# Patient Record
Sex: FEMALE | Race: OTHER | HISPANIC OR LATINO | Employment: FULL TIME | URBAN - METROPOLITAN AREA
[De-identification: names, ages, dates, MRNs, and addresses within clinical notes are randomized per-mention and may not be internally consistent; named-entity substitution may affect disease eponyms.]

---

## 2017-05-11 ENCOUNTER — ALLSCRIPTS OFFICE VISIT (OUTPATIENT)
Dept: OTHER | Facility: OTHER | Age: 27
End: 2017-05-11

## 2017-05-11 DIAGNOSIS — Z00.00 ENCOUNTER FOR GENERAL ADULT MEDICAL EXAMINATION WITHOUT ABNORMAL FINDINGS: ICD-10-CM

## 2017-05-11 DIAGNOSIS — L65.9 NONSCARRING HAIR LOSS: ICD-10-CM

## 2017-05-11 DIAGNOSIS — H10.13 ACUTE ATOPIC CONJUNCTIVITIS OF BOTH EYES: ICD-10-CM

## 2017-05-19 ENCOUNTER — GENERIC CONVERSION - ENCOUNTER (OUTPATIENT)
Dept: OTHER | Facility: OTHER | Age: 27
End: 2017-05-19

## 2017-08-03 ENCOUNTER — GENERIC CONVERSION - ENCOUNTER (OUTPATIENT)
Dept: OTHER | Facility: OTHER | Age: 27
End: 2017-08-03

## 2017-08-03 ENCOUNTER — TRANSCRIBE ORDERS (OUTPATIENT)
Dept: LAB | Facility: CLINIC | Age: 27
End: 2017-08-03

## 2017-08-03 ENCOUNTER — APPOINTMENT (OUTPATIENT)
Dept: LAB | Facility: CLINIC | Age: 27
End: 2017-08-03

## 2017-08-03 DIAGNOSIS — Z00.00 ENCOUNTER FOR GENERAL ADULT MEDICAL EXAMINATION WITHOUT ABNORMAL FINDINGS: ICD-10-CM

## 2017-08-03 DIAGNOSIS — L65.9 NONSCARRING HAIR LOSS: ICD-10-CM

## 2017-08-03 DIAGNOSIS — H10.13 ACUTE ATOPIC CONJUNCTIVITIS OF BOTH EYES: ICD-10-CM

## 2017-08-03 LAB
ALBUMIN SERPL BCP-MCNC: 4.3 G/DL (ref 3.5–5)
ALP SERPL-CCNC: 64 U/L (ref 46–116)
ALT SERPL W P-5'-P-CCNC: 24 U/L (ref 12–78)
ANION GAP SERPL CALCULATED.3IONS-SCNC: 9 MMOL/L (ref 4–13)
AST SERPL W P-5'-P-CCNC: 19 U/L (ref 5–45)
BASOPHILS # BLD AUTO: 0.03 THOUSANDS/ΜL (ref 0–0.1)
BASOPHILS NFR BLD AUTO: 0 % (ref 0–1)
BILIRUB SERPL-MCNC: 0.68 MG/DL (ref 0.2–1)
BUN SERPL-MCNC: 8 MG/DL (ref 5–25)
CALCIUM SERPL-MCNC: 9.5 MG/DL (ref 8.3–10.1)
CHLORIDE SERPL-SCNC: 101 MMOL/L (ref 100–108)
CHOLEST SERPL-MCNC: 182 MG/DL (ref 50–200)
CO2 SERPL-SCNC: 26 MMOL/L (ref 21–32)
CREAT SERPL-MCNC: 0.52 MG/DL (ref 0.6–1.3)
EOSINOPHIL # BLD AUTO: 0.37 THOUSAND/ΜL (ref 0–0.61)
EOSINOPHIL NFR BLD AUTO: 5 % (ref 0–6)
ERYTHROCYTE [DISTWIDTH] IN BLOOD BY AUTOMATED COUNT: 12.5 % (ref 11.6–15.1)
EST. AVERAGE GLUCOSE BLD GHB EST-MCNC: 111 MG/DL
GFR SERPL CREATININE-BSD FRML MDRD: 132 ML/MIN/1.73SQ M
GLUCOSE P FAST SERPL-MCNC: 77 MG/DL (ref 65–99)
HBA1C MFR BLD: 5.5 % (ref 4.2–6.3)
HCT VFR BLD AUTO: 40.6 % (ref 34.8–46.1)
HDLC SERPL-MCNC: 48 MG/DL (ref 40–60)
HGB BLD-MCNC: 14.2 G/DL (ref 11.5–15.4)
LDLC SERPL CALC-MCNC: 115 MG/DL (ref 0–100)
LYMPHOCYTES # BLD AUTO: 2.59 THOUSANDS/ΜL (ref 0.6–4.47)
LYMPHOCYTES NFR BLD AUTO: 35 % (ref 14–44)
MCH RBC QN AUTO: 30.3 PG (ref 26.8–34.3)
MCHC RBC AUTO-ENTMCNC: 35 G/DL (ref 31.4–37.4)
MCV RBC AUTO: 87 FL (ref 82–98)
MONOCYTES # BLD AUTO: 0.47 THOUSAND/ΜL (ref 0.17–1.22)
MONOCYTES NFR BLD AUTO: 6 % (ref 4–12)
NEUTROPHILS # BLD AUTO: 3.95 THOUSANDS/ΜL (ref 1.85–7.62)
NEUTS SEG NFR BLD AUTO: 54 % (ref 43–75)
NRBC BLD AUTO-RTO: 0 /100 WBCS
PLATELET # BLD AUTO: 234 THOUSANDS/UL (ref 149–390)
PMV BLD AUTO: 12.1 FL (ref 8.9–12.7)
POTASSIUM SERPL-SCNC: 3.8 MMOL/L (ref 3.5–5.3)
PROT SERPL-MCNC: 7.8 G/DL (ref 6.4–8.2)
RBC # BLD AUTO: 4.68 MILLION/UL (ref 3.81–5.12)
SODIUM SERPL-SCNC: 136 MMOL/L (ref 136–145)
TRIGL SERPL-MCNC: 95 MG/DL
TSH SERPL DL<=0.05 MIU/L-ACNC: 0.72 UIU/ML (ref 0.36–3.74)
WBC # BLD AUTO: 7.42 THOUSAND/UL (ref 4.31–10.16)

## 2017-08-03 PROCEDURE — 36415 COLL VENOUS BLD VENIPUNCTURE: CPT

## 2017-08-03 PROCEDURE — 80061 LIPID PANEL: CPT

## 2017-08-03 PROCEDURE — 83036 HEMOGLOBIN GLYCOSYLATED A1C: CPT

## 2017-08-03 PROCEDURE — 84443 ASSAY THYROID STIM HORMONE: CPT

## 2017-08-03 PROCEDURE — 85025 COMPLETE CBC W/AUTO DIFF WBC: CPT

## 2017-08-03 PROCEDURE — 80053 COMPREHEN METABOLIC PANEL: CPT

## 2017-10-04 ENCOUNTER — ALLSCRIPTS OFFICE VISIT (OUTPATIENT)
Dept: OTHER | Facility: OTHER | Age: 27
End: 2017-10-04

## 2017-10-05 NOTE — PROGRESS NOTES
Assessment  1  Acute upper respiratory infection (465 9) (J06 9)    Plan  Acute upper respiratory infection    · Promethazine-Codeine 6 25-10 MG/5ML Oral Syrup; TAKE 5 ML BY MOUTH AT  BEDTIME AS NEEDED    Discussion/Summary    Mod[place] Sims is here with upper respiratory infection symptoms  I explained that this is viral so antibiotics will not be helpful  I recommended increased water intake  She could tried Mucinex D in the morning and she could also use Tylenol alternating with ibuprofen about every 3 hours for body aches gave her prescription for promethazine with codeine for nighttime coughing  should contact us if symptoms are not improving  Possible side effects of new medications were reviewed with the patient/guardian today  The treatment plan was reviewed with the patient/guardian  The patient/guardian understands and agrees with the treatment plan      Chief Complaint  c/o cold/ allergic symptoms x 3days      History of Present Illness  HPI: Started with ST 3 days ago now nasal congestion/drainage  of coughing which is dry  GI sx  Review of Systems    Constitutional: feeling poorly,-chills-and-feeling tired, but-no fever  ENT: as noted in HPI  Cardiovascular: no complaints of slow or fast heart rate, no chest pain, no palpitations, no leg claudication or lower extremity edema  Respiratory: cough, but-no shortness of breath-and-no wheezing  Gastrointestinal: no complaints of abdominal pain, no constipation, no nausea or diarrhea, no vomiting, no bloody stools  Active Problems  1  Allergic conjunctivitis of both eyes (372 14) (H10 13)   2  Thinning hair (704 00) (L65 9)    Past Medical History  Active Problems And Past Medical History Reviewed: The active problems and past medical history were reviewed and updated today  Family History  Mother    1   Family history of Controlled type 2 diabetes mellitus    Social History   · Caffeine use (C67 89) (F15 90)   · Never smoker   · No illicit drug use   · Occasional alcohol use    Current Meds   1  Cromolyn Sodium 4 % Ophthalmic Solution; INSTILL 1-2 DROPS INTO AFFECTED   EYE(S)  4 TIMES DAILY AS NEEDED; Therapy: 69HPA2776 to (Evaluate:[id number])  Requested for: 87RXM7251; Last   Rx:[id number] Ordered    The medication list was reviewed and updated today  Allergies  1  No Known Drug Allergies    Vitals   Recorded: 07DRP4818 01:25PM   Temperature 98 8 F   Heart Rate 80   Respiration 16   Systolic 635   Diastolic 60   Height 4 ft 11 4 in   Weight 126 lb    BMI Calculated 25 11   BSA Calculated 1 52     Physical Exam    Constitutional   General appearance: No acute distress, well appearing and well nourished  Eyes   Conjunctiva and lids: No swelling, erythema or discharge  Pupils and irises: Equal, round and reactive to light  Ears, Nose, Mouth, and Throat   External inspection of ears and nose: Normal     Otoscopic examination: Tympanic membranes translucent with normal light reflex  Canals patent without erythema  Nasal mucosa, septum, and turbinates: Abnormal   There was clear rhinorrhea from both nares  Oropharynx: Normal with no erythema, edema, exudate or lesions  Pulmonary   Respiratory effort: No increased work of breathing or signs of respiratory distress  Auscultation of lungs: Clear to auscultation  Cardiovascular   Auscultation of heart: Normal rate and rhythm, normal S1 and S2, without murmurs  Lymphatic   Palpation of lymph nodes in neck: No lymphadenopathy           Signatures   Electronically signed by : Murphy Wilcox MD; Oct  4 2017  6:42PM EST                       (Author)

## 2018-01-10 NOTE — RESULT NOTES
Verified Results  (1) CBC/PLT/DIFF [id number] 09:42AM Patti Williamson    Order Number: [id number]     Test Name Result Flag Reference   WBC COUNT 7 42 Thousand/uL  4 31-10 16   RBC COUNT 4 68 Million/uL  3 81-5 12   HEMOGLOBIN 14 2 g/dL  11 5-15 4   HEMATOCRIT 40 6 %  34 8-46  1   MCV 87 fL  82-98   MCH 30 3 pg  26 8-34 3   MCHC 35 0 g/dL  31 4-37 4   RDW 12 5 %  11 6-15 1   MPV 12 1 fL  8 9-12 7   PLATELET COUNT 963 Thousands/uL  149-390   nRBC AUTOMATED 0 /100 WBCs     NEUTROPHILS RELATIVE PERCENT 54 %  43-75   LYMPHOCYTES RELATIVE PERCENT 35 %  14-44   MONOCYTES RELATIVE PERCENT 6 %  4-12   EOSINOPHILS RELATIVE PERCENT 5 %  0-6   BASOPHILS RELATIVE PERCENT 0 %  0-1   NEUTROPHILS ABSOLUTE COUNT 3 95 Thousands/? ??L  1 85-7 62   LYMPHOCYTES ABSOLUTE COUNT 2 59 Thousands/? ??L  0 60-4 47   MONOCYTES ABSOLUTE COUNT 0 47 Thousand/? ??L  0 17-1 22   EOSINOPHILS ABSOLUTE COUNT 0 37 Thousand/? ??L  0 00-0 61   BASOPHILS ABSOLUTE COUNT 0 03 Thousands/? ??L  0 00-0 10     (1) COMPREHENSIVE METABOLIC PANEL [id number] 30:10ZW Patti Williamson    Order Number: [id number]     Test Name Result Flag Reference   SODIUM 136 mmol/L  136-145   POTASSIUM 3 8 mmol/L  3 5-5 3   CHLORIDE 101 mmol/L  100-108   CARBON DIOXIDE 26 mmol/L  21-32   ANION GAP (CALC) 9 mmol/L  4-13   BLOOD UREA NITROGEN 8 mg/dL  5-25   CREATININE 0 52 mg/dL L 0 60-1 30   Standardized to IDMS reference method   CALCIUM 9 5 mg/dL  8 3-10 1   BILI, TOTAL 0 68 mg/dL  0 20-1 00   ALK PHOSPHATAS 64 U/L     ALT (SGPT) 24 U/L  12-78   AST(SGOT) 19 U/L  5-45   ALBUMIN 4 3 g/dL  3 5-5 0   TOTAL PROTEIN 7 8 g/dL  6 4-8 2   eGFR 132 ml/min/1 73sq m     National Kidney Disease Education Program recommendations are as follows:  GFR calculation is accurate only with a steady state creatinine  Chronic Kidney disease less than 60 ml/min/1 73 sq  meters  Kidney failure less than 15 ml/min/1 73 sq  meters     GLUCOSE FASTING 77 mg/dL  65-99     (1) TSH WITH FT4 REFLEX 03Aug2017 09:42AM James Clear   TW Order Number: [id number]     Test Name Result Flag Reference   TSH 0 722 uIU/mL  0 358-3 740   Patients undergoing fluorescein dye angiography may retain small amounts of fluorescein in the body for 48-72 hours post procedure  Samples containing fluorescein can produce falsely depressed TSH values  If the patient had this procedure,a specimen should be resubmitted post fluorescein clearance  The recommended reference ranges for TSH during pregnancy are as follows:  First trimester 0 1 to 2 5 uIU/mL  Second trimester  0 2 to 3 0 uIU/mL  Third trimester 0 3 to 3 0 uIU/m     (1) LIPID PANEL, FASTING 03Aug2017 09:42AM James Clear   TW Order Number: [id number]     Test Name Result Flag Reference   CHOLESTEROL 182 mg/dL     HDL,DIRECT 48 mg/dL  40-60   Specimen collection should occur prior to Metamizole administration due to the potential for falsely depressed results  LDL CHOLESTEROL CALCULATED 115 mg/dL H 0-100   Triglyceride:         Normal              <150 mg/dl       Borderline High    150-199 mg/dl       High               200-499 mg/dl       Very High          >499 mg/dl  Cholesterol:         Desirable        <200 mg/dl      Borderline High  200-239 mg/dl      High             >239 mg/dl  HDL Cholesterol:        High    >59 mg/dL      Low     <41 mg/dL  LDL CALCULATED:    This screening LDL is a calculated result  It does not have the accuracy of the Direct Measured LDL in the monitoring of patients with hyperlipidemia and/or statin therapy  Direct Measure LDL (RDK895) must be ordered separately in these patients  TRIGLYCERIDES 95 mg/dL  <=150   Specimen collection should occur prior to N-Acetylcysteine or Metamizole administration due to the potential for falsely depressed results       (1) HEMOGLOBIN A1C 03Aug2017 09:42AM James Clear   TW Order Number: [id number]     Test Name Result Flag Reference HEMOGLOBIN A1C 5 5 %  4 2-6 3   EST  AVG   GLUCOSE 111 mg/dl

## 2018-01-11 NOTE — MISCELLANEOUS
Message  Return to work or school:   Jillian Villagran is under my professional care   She was seen in my office on 10/04/2017 FOR Acute upper respiratory infection (465 9) (J06 9)   She is able to return to work on  10/06/2017       Nusrat Edmond MD       Signatures   Electronically signed by : Nusrat Edmond MD; Oct  4 2017  7:03PM EST                       (Author)    Electronically signed by : Gabi Serrato, ; Oct  9 2017  4:25PM EST                       (Author)

## 2018-01-12 VITALS
HEART RATE: 80 BPM | WEIGHT: 124.13 LBS | SYSTOLIC BLOOD PRESSURE: 106 MMHG | HEIGHT: 59 IN | TEMPERATURE: 98.2 F | BODY MASS INDEX: 25.02 KG/M2 | DIASTOLIC BLOOD PRESSURE: 76 MMHG

## 2018-01-14 VITALS
RESPIRATION RATE: 16 BRPM | HEIGHT: 59 IN | WEIGHT: 126 LBS | SYSTOLIC BLOOD PRESSURE: 110 MMHG | DIASTOLIC BLOOD PRESSURE: 60 MMHG | TEMPERATURE: 98.8 F | BODY MASS INDEX: 25.4 KG/M2 | HEART RATE: 80 BPM

## 2018-01-18 NOTE — MISCELLANEOUS
Message   Recorded as Task   Date: 05/19/2017 10:48 AM, Created By: Ted Craven   Task Name: Medical Complaint Callback   Assigned To: Ted Craven   Regarding Patient: Alphonza Fabry, Status: Active   Comment:    Ted Craven - 19 May 2017 10:48 AM     TASK CREATED  Caller: Self; Medical Complaint; [phone] (Home)  Pt called and l/m stating that the cromalyn eye gtts burn eyes when she initially puts in eye and she questions if this is usual   She also states zyrtec is not helping her allergy s/s and she is questioning what else she can use? Bernarda Gerardo - 19 May 2017 12:29 PM     TASK REPLIED TO: Previously Assigned To Bernarda Gerardo  Please tell the patient that her hearing is one of the common reactions to those eyedrops  Is the burning resolving quickly after the drops are instilled or does it persist for a long time? She wanted to try a different eyedrop? Burning is still a possibility but if she prefers she could try Patanol 0 1% drops, one drop in each eye twice daily as needed ( by 6-8 hours)  Instead of Zyrtec she could try using Allegra or Claritin  Curt,Lara - 19 May 2017 3:52 PM     TASK EDITED  Pt notified  Active Problems    1  Allergic conjunctivitis of both eyes (372 14) (H10 13)   2  Thinning hair (704 00) (L65 9)    Current Meds   1  Cromolyn Sodium 4 % Ophthalmic Solution; INSTILL 1-2 DROPS INTO AFFECTED   EYE(S)  4 TIMES DAILY AS NEEDED; Therapy: 50XYE8804 to (Evaluate:38Cxe9467)  Requested for: 58HNW1413; Last   Rx:[id number] Ordered    Allergies    1   No Known Drug Allergies    Signatures   Electronically signed by : Yuli Washington, ; May 19 2017  3:53PM EST                       (Author)

## 2018-06-07 ENCOUNTER — OFFICE VISIT (OUTPATIENT)
Dept: FAMILY MEDICINE CLINIC | Facility: CLINIC | Age: 28
End: 2018-06-07
Payer: COMMERCIAL

## 2018-06-07 VITALS
SYSTOLIC BLOOD PRESSURE: 100 MMHG | HEIGHT: 59 IN | BODY MASS INDEX: 26.41 KG/M2 | DIASTOLIC BLOOD PRESSURE: 70 MMHG | WEIGHT: 131 LBS

## 2018-06-07 DIAGNOSIS — Z00.00 ROUTINE ADULT HEALTH MAINTENANCE: Primary | ICD-10-CM

## 2018-06-07 DIAGNOSIS — J30.89 NON-SEASONAL ALLERGIC RHINITIS, UNSPECIFIED TRIGGER: ICD-10-CM

## 2018-06-07 PROBLEM — H10.13 ALLERGIC CONJUNCTIVITIS OF BOTH EYES: Status: ACTIVE | Noted: 2017-05-11

## 2018-06-07 PROBLEM — L65.9 THINNING HAIR: Status: ACTIVE | Noted: 2017-05-11

## 2018-06-07 PROCEDURE — 90715 TDAP VACCINE 7 YRS/> IM: CPT

## 2018-06-07 PROCEDURE — 90471 IMMUNIZATION ADMIN: CPT

## 2018-06-07 PROCEDURE — 99395 PREV VISIT EST AGE 18-39: CPT | Performed by: FAMILY MEDICINE

## 2018-06-07 NOTE — PROGRESS NOTES
Assessment/Plan:     Diagnoses and all orders for this visit:    Routine adult health maintenance  -     Lipid panel; Future  -     Hemoglobin A1c (w/out EAG); Future  -     TDAP VACCINE GREATER THAN OR EQUAL TO 6YO IM    Non-seasonal allergic rhinitis, unspecified trigger  -     Ambulatory referral to Allergy; Future    Other orders  -     levonorgestrel (MIRENA) 20 MCG/24HR IUD; 20 mcg/day          Subjective:      Patient ID: Familia Cotto is a 33 y o  female  She is here for health maintenance physical exam   She has been feeling well except for chronic allergic rhinitis  Unfortunately she has runny nose, sneezing and itchy eyes almost year round  She takes over-the-counter medications, but it is very expensive  She has a healthy diet and goes to the gym 3 times per week  UTD with dentist  Birth control with mirena  She will be going to gyn near future  She is single, lives with her 9 yr old son  She works in medical billing and coding  She continues with hair thinning, normal labs last year        The following portions of the patient's history were reviewed and updated as appropriate: allergies, current medications, past family history, past medical history, past social history, past surgical history and problem list     Review of Systems   Constitutional: Negative for activity change, chills, fatigue and fever  HENT: Positive for congestion and rhinorrhea  Negative for ear pain, sinus pressure and sore throat  Eyes: Negative for pain and visual disturbance  Respiratory: Negative for cough, chest tightness, shortness of breath and wheezing  Cardiovascular: Negative for chest pain, palpitations and leg swelling  Gastrointestinal: Negative for abdominal pain, blood in stool, constipation, diarrhea, nausea and vomiting  Endocrine: Negative for polydipsia and polyuria  Genitourinary: Negative for difficulty urinating, dysuria, frequency and urgency     Musculoskeletal: Negative for arthralgias, joint swelling and myalgias  Skin: Negative for rash  Neurological: Negative for dizziness, weakness, numbness and headaches  Hematological: Negative for adenopathy  Does not bruise/bleed easily  Psychiatric/Behavioral: Negative for dysphoric mood  The patient is not nervous/anxious  Objective:      /70 (BP Location: Left arm, Patient Position: Sitting, Cuff Size: Standard)   Ht 4' 11 4" (1 509 m)   Wt 59 4 kg (131 lb)   BMI 26 10 kg/m²          Physical Exam   Nursing note and vitals reviewed

## 2018-06-15 ENCOUNTER — TRANSCRIBE ORDERS (OUTPATIENT)
Dept: LAB | Facility: CLINIC | Age: 28
End: 2018-06-15

## 2018-06-15 ENCOUNTER — APPOINTMENT (OUTPATIENT)
Dept: LAB | Facility: CLINIC | Age: 28
End: 2018-06-15

## 2018-06-15 DIAGNOSIS — Z00.8 HEALTH EXAMINATION IN POPULATION SURVEYS: ICD-10-CM

## 2018-06-15 DIAGNOSIS — Z00.8 HEALTH EXAMINATION IN POPULATION SURVEYS: Primary | ICD-10-CM

## 2018-06-15 LAB
CHOLEST SERPL-MCNC: 161 MG/DL (ref 50–200)
EST. AVERAGE GLUCOSE BLD GHB EST-MCNC: 108 MG/DL
HBA1C MFR BLD: 5.4 % (ref 4.2–6.3)
HDLC SERPL-MCNC: 49 MG/DL (ref 40–60)
LDLC SERPL CALC-MCNC: 95 MG/DL (ref 0–100)
NONHDLC SERPL-MCNC: 112 MG/DL
TRIGL SERPL-MCNC: 84 MG/DL

## 2018-06-15 PROCEDURE — 83036 HEMOGLOBIN GLYCOSYLATED A1C: CPT

## 2018-06-15 PROCEDURE — 80061 LIPID PANEL: CPT

## 2018-06-15 PROCEDURE — 36415 COLL VENOUS BLD VENIPUNCTURE: CPT

## 2018-10-03 ENCOUNTER — OFFICE VISIT (OUTPATIENT)
Dept: OBGYN CLINIC | Facility: CLINIC | Age: 28
End: 2018-10-03
Payer: COMMERCIAL

## 2018-10-03 VITALS
WEIGHT: 130.4 LBS | SYSTOLIC BLOOD PRESSURE: 106 MMHG | HEIGHT: 61 IN | BODY MASS INDEX: 24.62 KG/M2 | DIASTOLIC BLOOD PRESSURE: 84 MMHG

## 2018-10-03 DIAGNOSIS — Z30.432 ENCOUNTER FOR IUD REMOVAL: Primary | ICD-10-CM

## 2018-10-03 PROCEDURE — 58301 REMOVE INTRAUTERINE DEVICE: CPT | Performed by: OBSTETRICS & GYNECOLOGY

## 2018-10-03 PROCEDURE — 99202 OFFICE O/P NEW SF 15 MIN: CPT | Performed by: OBSTETRICS & GYNECOLOGY

## 2018-10-03 NOTE — PROGRESS NOTES
A/P    1   IUD removal    Placed in 2011 wants it out   Pt is not active at this time     No cycle in years this is the second [address] does not stop ovulation and if become active will need to use condoms     Last pap 11/2015 - will come in Nov for annual       34 y/o  Presents for the removal of IUD  Wants to use nothing and if becomes active will consider ocp      Past medical / social / surgical / family history reviewed and updated   Medication and allergies discussed in detail and updated       Review of Systems - History obtained from chart review and the patient  General ROS: negative  Psychological ROS: negative  Ophthalmic ROS: negative  ENT ROS: negative  Allergy and Immunology ROS: negative  Hematological and Lymphatic ROS: negative  Endocrine ROS: negative  Breast ROS: negative for breast lumps  Respiratory ROS: no cough, shortness of breath, or wheezing  Cardiovascular ROS: no chest pain or dyspnea on exertion  Gastrointestinal ROS: no abdominal pain, change in bowel habits, or black or bloody stools  Genito-Urinary ROS: no dysuria, trouble voiding, or hematuria  Musculoskeletal ROS: negative  Neurological ROS: no TIA or stroke symptoms  Dermatological ROS: negative      Pelvic exam     IUD removed string was present see procedure note

## 2018-10-03 NOTE — PROGRESS NOTES
Iud removal  Date/Time: 10/3/2018 5:01 PM  Performed by: Destin Titus  Authorized by: Destin Titus     Consent:     Consent obtained:  Verbal    Consent given by:  Patient    Procedure risks and benefits discussed: yes      Patient questions answered: yes      Patient agrees, verbalizes understanding, and wants to proceed: yes      Educational handouts given: no      Instructions and paperwork completed: yes    Procedure:     Removed with no complications: yes      Removal due to mechanical complications of IUD: no      Removal due to infection and inflammatory reaction: no      Other reason for removal:  S/p 5 years

## 2018-10-03 NOTE — PATIENT INSTRUCTIONS
Intrauterine Device   WHAT YOU NEED TO KNOW:   An intrauterine device (IUD) is a type of birth control that is inserted into your uterus  It is a small, flexible piece of plastic with a string on the end  It is inserted and removed by your healthcare provider  IUDs prevent sperm from reaching or fertilizing an egg  IUDs also prevent a fertilized egg from attaching to the uterus and developing into a fetus  DISCHARGE INSTRUCTIONS:   Make sure your IUD is in place: An IUD has a string that is made of plastic thread  One to 2 inches of this string hangs into your vagina  You cannot see this string, and it will not cause problems when you have sex  Check your IUD string every 3 days for the first 3 months that you have your IUD  After that, check the string after each monthly period  Do the following to check the placement of your IUD:  · Wash your hands with soap and warm water  Dry them with a clean towel  · Bend your knees and squat low to the ground  · Gently put your index finger inside your vagina  The cervix is at the top of the vagina and feels like the tip of your nose  Feel for the IUD string  Do not pull on the string  You should not be able to feel the firm plastic of the IUD itself  · Wash your hands after you check your IUD string  For more information:   · [facility] [address] , [address]  Phone: 5- 250 - [phone]  Web Address: https://[employer]  org  Follow up with your healthcare provider as directed:  Write down your questions so you remember to ask them during your visits  Contact your healthcare provider if:   · You think you are pregnant  · You bleed after you have sex  · You have pain during sex  · You cannot feel the IUD string, the string feels longer, or you feel the plastic of the IUD itself  · You have vaginal discharge that is green, yellow, or has a foul odor      · You have questions or concerns about your condition or care  Seek care immediately if:   · You have severe pain or bleeding during your period  · You have a fever and severe abdominal pain  © 2017 [address] Information is for End User's use only and may not be sold, redistributed or otherwise used for commercial purposes  All illustrations and images included in CareNotes® are the copyrighted property of [employer] , [employer]  or [employer] French  The above information is an  only  It is not intended as medical advice for individual conditions or treatments  Talk to your doctor, nurse or pharmacist before following any medical regimen to see if it is safe and effective for you

## 2018-11-29 ENCOUNTER — ANNUAL EXAM (OUTPATIENT)
Dept: OBGYN CLINIC | Facility: CLINIC | Age: 28
End: 2018-11-29
Payer: COMMERCIAL

## 2018-11-29 VITALS
DIASTOLIC BLOOD PRESSURE: 60 MMHG | SYSTOLIC BLOOD PRESSURE: 108 MMHG | WEIGHT: 125.8 LBS | BODY MASS INDEX: 25.36 KG/M2 | HEIGHT: 59 IN

## 2018-11-29 DIAGNOSIS — Z30.011 ENCOUNTER FOR INITIAL PRESCRIPTION OF CONTRACEPTIVE PILLS: ICD-10-CM

## 2018-11-29 DIAGNOSIS — Z12.4 SCREENING FOR CERVICAL CANCER: ICD-10-CM

## 2018-11-29 DIAGNOSIS — Z11.3 SCREENING FOR STD (SEXUALLY TRANSMITTED DISEASE): Primary | ICD-10-CM

## 2018-11-29 DIAGNOSIS — Z11.51 SCREENING FOR HPV (HUMAN PAPILLOMAVIRUS): ICD-10-CM

## 2018-11-29 PROCEDURE — 99395 PREV VISIT EST AGE 18-39: CPT | Performed by: OBSTETRICS & GYNECOLOGY

## 2018-11-29 PROCEDURE — 87491 CHLMYD TRACH DNA AMP PROBE: CPT | Performed by: OBSTETRICS & GYNECOLOGY

## 2018-11-29 PROCEDURE — 87591 N.GONORRHOEAE DNA AMP PROB: CPT | Performed by: OBSTETRICS & GYNECOLOGY

## 2018-11-29 PROCEDURE — G0145 SCR C/V CYTO,THINLAYER,RESCR: HCPCS | Performed by: OBSTETRICS & GYNECOLOGY

## 2018-11-29 RX ORDER — NORETHINDRONE ACETATE AND ETHINYL ESTRADIOL 1MG-20(21)
1 KIT ORAL DAILY
Qty: 84 TABLET | Refills: 3 | Status: SHIPPED | OUTPATIENT
Start: 2018-11-29 | End: 2019-02-22

## 2018-11-29 NOTE — PATIENT INSTRUCTIONS
Pap Smear   GENERAL INFORMATION:   What is a Pap smear? A Pap smear, or Pap test, is a procedure to check your cervix for abnormal cells  The cervix is the narrow opening at the bottom of your uterus  The cervix meets the top part of the vagina  How do I prepare for a Pap smear? The best time to schedule the test is right after your period stops  Do not have a Pap smear during your monthly period  Do not have intercourse or put anything in your vagina for 24 hours before your test    What will happen during a Pap smear? · You will lie on your back and place your feet on footrests called stirrups  Your caregiver will gently insert a device called a speculum into your vagina  The speculum is used to spread the walls of your vagina so he can see your cervix  He will use a thin brush or cotton swab to collect cells from the inside of your cervix  · Your caregiver will also collect cells from the surface of your cervix with a plastic or wooden tool called a spatula  He may also gently scrape the upper part of your vagina for a sample  The samples are placed in a container with liquid or on a glass slide  They are sent to a lab and examined for abnormal cells  How often do I need a Pap smear? Pap smears are usually done every 1 to 3 years  You may need a Pap smear more often if you have any of the following:  · Positive test result for the human papillomavirus (HPV)    · Cervical intraepithelial neoplasm or cervical cancer    · HIV    · A weak immune system    · Exposure to diethylstilbestrol (JULIOCESAR) medicine when your mother was pregnant with you  CARE AGREEMENT:   You have the right to help plan your care  Learn about your health condition and how it may be treated  Discuss treatment options with your caregivers to decide what care you want to receive  You always have the right to refuse treatment  The above information is an  only   It is not intended as medical advice for individual conditions or treatments  Talk to your doctor, nurse or pharmacist before following any medical regimen to see if it is safe and effective for you  © 2014 [address] is for End User's use only and may not be sold, redistributed or otherwise used for commercial purposes  All illustrations and images included in CareNotes® are the copyrighted property of A [employer] Inc  or [employer] French

## 2018-11-29 NOTE — PROGRESS NOTES
A/P    1  Annual -    Pap > 3 years   Pap done today with reflex HPV     2  Contraception    Mirena pulled last visit   Cycle returned for 3 days    Wants OCP for now does not want IUD       34 y/o p1  Annual exam no complaints  Cycle did return to normal     Past medical / social / surgical / family history reviewed and updated   Medication and allergies discussed in detail and updated     Review of Systems - History obtained from chart review and the patient  General ROS: negative  Psychological ROS: negative  Ophthalmic ROS: negative  ENT ROS: negative  Allergy and Immunology ROS: negative  Hematological and Lymphatic ROS: negative  Endocrine ROS: negative  Breast ROS: negative for breast lumps  Respiratory ROS: no cough, shortness of breath, or wheezing  Cardiovascular ROS: no chest pain or dyspnea on exertion  Gastrointestinal ROS: no abdominal pain, change in bowel habits, or black or bloody stools  Genito-Urinary ROS: no dysuria, trouble voiding, or hematuria  Musculoskeletal ROS: negative  Neurological ROS: no TIA or stroke symptoms  Dermatological ROS: negative    Physical Exam   Constitutional: She is oriented to person, place, and time  She appears well-developed  Eyes: Pupils are equal, round, and reactive to light  Neck: Normal range of motion  No thyromegaly present  Cardiovascular: Normal rate  Pulmonary/Chest: Effort normal  No respiratory distress  Right breast exhibits no inverted nipple, no mass and no tenderness  Left breast exhibits no inverted nipple, no mass and no tenderness  Breasts are symmetrical    Abdominal: Soft  She exhibits no distension  There is no tenderness  Genitourinary: Vagina normal and uterus normal  Cervix exhibits no motion tenderness and no discharge  Right adnexum displays no mass, no tenderness and no fullness  Left adnexum displays no mass, no tenderness and no fullness  No vaginal discharge found  Lymphadenopathy:     She has no cervical adenopathy  Neurological: She is alert and oriented to person, place, and time  Psychiatric: She has a normal mood and affect  Her behavior is normal  Judgment and thought content normal    Vitals reviewed

## 2018-11-30 LAB
C TRACH DNA SPEC QL NAA+PROBE: NEGATIVE
N GONORRHOEA DNA SPEC QL NAA+PROBE: NEGATIVE

## 2018-12-04 LAB
LAB AP GYN PRIMARY INTERPRETATION: NORMAL
Lab: NORMAL

## 2018-12-10 ENCOUNTER — TELEPHONE (OUTPATIENT)
Dept: OBGYN CLINIC | Facility: CLINIC | Age: 28
End: 2018-12-10

## 2019-01-02 ENCOUNTER — OFFICE VISIT (OUTPATIENT)
Dept: FAMILY MEDICINE CLINIC | Facility: CLINIC | Age: 29
End: 2019-01-02
Payer: COMMERCIAL

## 2019-01-02 VITALS
TEMPERATURE: 98.4 F | BODY MASS INDEX: 24.17 KG/M2 | WEIGHT: 128 LBS | SYSTOLIC BLOOD PRESSURE: 110 MMHG | DIASTOLIC BLOOD PRESSURE: 74 MMHG | HEIGHT: 61 IN | RESPIRATION RATE: 18 BRPM

## 2019-01-02 DIAGNOSIS — H10.33 ACUTE BACTERIAL CONJUNCTIVITIS OF BOTH EYES: Primary | ICD-10-CM

## 2019-01-02 PROCEDURE — 99213 OFFICE O/P EST LOW 20 MIN: CPT | Performed by: INTERNAL MEDICINE

## 2019-01-02 RX ORDER — POLYMYXIN B SULFATE AND TRIMETHOPRIM 1; 10000 MG/ML; [USP'U]/ML
1 SOLUTION OPHTHALMIC EVERY 6 HOURS
Qty: 10 ML | Refills: 0 | Status: SHIPPED | OUTPATIENT
Start: 2019-01-02 | End: 2019-10-07 | Stop reason: ALTCHOICE

## 2019-01-02 NOTE — PROGRESS NOTES
Assessment/Plan:       Diagnoses and all orders for this visit:    Acute bacterial conjunctivitis of both eyes  -     polymyxin b-trimethoprim (POLYTRIM) ophthalmic solution; Administer 1 drop to both eyes every 6 (six) hours      With her symptoms and exam, I am going to have her complete abx drops  I asked that she refrain from contact use wear during this time as well  Warm compresses to help with crusting as well  Otherwise no other changes were made  Subjective:      Patient ID: Andree Rollins is a 30 y o  female  Charl Oj is here today with complaints of irritative eyes  She reports that symptoms started about 3 days ago with crusting of the left eye  She reports thinking it was more related to her allergies  She reports it has progressed and wakes up with crusting daily  She notes it on the right as well  See below  Conjunctivitis    The current episode started 3 to 5 days ago  The onset was gradual  The problem occurs continuously  The problem has been gradually worsening  Associated symptoms include eye discharge and eye redness  Pertinent negatives include no orthopnea, no fever, no decreased vision, no double vision, no eye itching, no photophobia, no abdominal pain, no nausea, no vomiting, no congestion, no ear discharge, no ear pain, no headaches, no rhinorrhea, no sore throat, no muscle aches, no neck stiffness, no cough, no wheezing and no eye pain  The following portions of the patient's history were reviewed and updated as appropriate: allergies, current medications, past family history, past medical history, past social history, past surgical history and problem list     Review of Systems   Constitutional: Negative for fever  HENT: Negative for congestion, ear discharge, ear pain, rhinorrhea and sore throat  Eyes: Positive for discharge and redness  Negative for double vision, photophobia, pain and itching  Respiratory: Negative for cough and wheezing      Cardiovascular: Negative for orthopnea  Gastrointestinal: Negative for abdominal pain, nausea and vomiting  Neurological: Negative for headaches  Objective:      /74   Temp 98 4 °F (36 9 °C)   Resp 18   Ht 5' 1" (1 549 m)   Wt 58 1 kg (128 lb)   BMI 24 19 kg/m²          Physical Exam   Constitutional: She is oriented to person, place, and time  She appears well-developed and well-nourished  No distress  HENT:   Head: Normocephalic and atraumatic  Right Ear: External ear normal    Left Ear: External ear normal    Mouth/Throat: Oropharynx is clear and moist    Eyes: EOM are normal  Right eye exhibits no discharge  Left eye exhibits no discharge  Left conjunctiva is injected  No scleral icterus  Neck: Normal range of motion  Cardiovascular: Normal rate, regular rhythm and normal heart sounds  No murmur heard  Pulmonary/Chest: Effort normal and breath sounds normal  No respiratory distress  She has no wheezes  She exhibits no tenderness  Musculoskeletal: Normal range of motion  She exhibits no edema  Lymphadenopathy:     She has no cervical adenopathy  Neurological: She is alert and oriented to person, place, and time  Skin: She is not diaphoretic  Psychiatric: She has a normal mood and affect  Her speech is normal and behavior is normal  Judgment and thought content normal    Vitals reviewed

## 2019-01-02 NOTE — LETTER
January 2, 2019     Patient: Eva Mathis   YOB: 1990   Date of Visit: 1/2/2019       To Whom it May Concern:    Eva Mathis is under my professional care  She was seen in my office on 1/2/2019  If you have any questions or concerns, please don't hesitate to call           Sincerely,            Alka Limon, DO

## 2019-02-22 ENCOUNTER — TELEPHONE (OUTPATIENT)
Dept: OBGYN CLINIC | Facility: CLINIC | Age: 29
End: 2019-02-22

## 2019-02-22 ENCOUNTER — OFFICE VISIT (OUTPATIENT)
Dept: OBGYN CLINIC | Facility: CLINIC | Age: 29
End: 2019-02-22
Payer: COMMERCIAL

## 2019-02-22 VITALS
HEIGHT: 61 IN | WEIGHT: 126.8 LBS | SYSTOLIC BLOOD PRESSURE: 92 MMHG | DIASTOLIC BLOOD PRESSURE: 70 MMHG | BODY MASS INDEX: 23.94 KG/M2

## 2019-02-22 DIAGNOSIS — Z30.09 VISIT FOR FAMILY PLANNING EDUCATION: Primary | ICD-10-CM

## 2019-02-22 PROCEDURE — 99212 OFFICE O/P EST SF 10 MIN: CPT | Performed by: OBSTETRICS & GYNECOLOGY

## 2019-02-22 NOTE — PATIENT INSTRUCTIONS
Etonogestrel (Implant)   Etonogestrel (e-toe-nikolai-SALMA-trel)  Prevents pregnancy  Brand Name(s): Nexplanon   There may be other brand names for this medicine  When This Medicine Should Not Be Used: This medicine is not right for everyone  You should not receive it if you had an allergic reaction to etonogestrel, or if you are pregnant  Do not use it if you have breast cancer, heart disease, liver disease, or a history of blood clots (such as deep vein thrombosis, pulmonary embolism, or stroke)  How to Use This Medicine:   Implant  · A nurse or other trained health professional will give you this medicine  · This medicine is an implant  It will be surgically placed under the skin of your upper, inner arm  · Gently press your fingertips over the skin where this medicine was inserted  You should be able to feel the implant  · You might have to use another form of birth control for 7 days after the implant is inserted  Your doctor will tell you if this is needed  · Your doctor can remove the implant at any time if you want to stop using this medicine  The implant must be removed after 3 years, but you may have a new one inserted if you still want to use this form of birth control  · Read and follow the patient instructions that come with this medicine  Talk to your doctor or pharmacist if you have any questions  Drugs and Foods to Avoid:   Ask your doctor or pharmacist before using any other medicine, including over-the-counter medicines, vitamins, and herbal products  · Some foods and medicines can affect how etonogestrel works  Tell your doctor if you are using any of the following:  ¨ Bosentan, carbamazepine, cyclosporine, felbamate, griseofulvin, itraconazole, ketoconazole, lamotrigine, oxcarbazepine, phenobarbital, phenytoin, rifampin, Fabiola wort, topiramate  ¨ Medicine for HIV/AIDS  Warnings While Using This Medicine:   · Tell your doctor right away if you think you become pregnant   The implant will need to be removed  · Tell your doctor if you have cancer, blood circulation problems, high blood pressure, or kidney disease, or if you smoke  Tell your doctor if you are breastfeeding, or if you have recently given birth  Also tell your doctor if you have diabetes or prediabetes, high cholesterol, or a history of depression  · This medicine may cause the following problems:  ¨ Ectopic (tubal) pregnancy  ¨ Cysts in the ovaries  ¨ Possible risk of breast cancer  ¨ Higher risk of heart attack, stroke, or blood clots  ¨ Liver cancers or tumors  ¨ High blood pressure  · This medicine may change your usual menstrual cycle  You might have irregular bleeding, or your periods may be lighter, shorter, heavier, or longer  You might not have a period in some cycles  However, call your doctor if you think you are pregnant or if you have severe pain or changes that worry you  · This medicine will not protect you from HIV/AIDS or other sexually transmitted diseases  · You might need to have the implant removed if you will be inactive for a period of time, such as after major surgery, because of the risk of blood clots  · Tell any doctor or dentist who treats you that you are using this medicine  This medicine may affect certain medical test results  · Your doctor will check your progress and the effects of this medicine at regular visits  Keep all appointments  · Keep all medicine out of the reach of children  Never share your medicine with anyone    Possible Side Effects While Using This Medicine:   Call your doctor right away if you notice any of these side effects:  · Allergic reaction: Itching or hives, swelling in your face or hands, swelling or tingling in your mouth or throat, chest tightness, trouble breathing  · Chest pain, trouble breathing, or coughing up blood  · Dark urine or pale stools, nausea, vomiting, loss of appetite, stomach pain, yellow skin or eyes  · Double vision or other trouble seeing  · Numbness or weakness on one side of your body, sudden or severe headache, problems with vision, speech, or walking  · Pain in your lower leg (calf)  · Severe or ongoing pain, tingling, bleeding, bruising, redness, itching, or swelling where the implant is placed  · Unusual or severe pain in your abdomen  · Unusual or unexpected vaginal bleeding or heavy bleeding  If you notice these less serious side effects, talk with your doctor:   · Acne or pimples  · Mild headache  · Mild pain, tingling, bleeding, bruising, redness, itching, or swelling where the implant is placed  · Mood changes  · Weight gain  If you notice other side effects that you think are caused by this medicine, tell your doctor  Call your doctor for medical advice about side effects  You may report side effects to FDA at 7-742-FDA-3923  © 2017 [employer] Information is for End User's use only and may not be sold, redistributed or otherwise used for commercial purposes  The above information is an  only  It is not intended as medical advice for individual conditions or treatments  Talk to your doctor, nurse or pharmacist before following any medical regimen to see if it is safe and effective for you

## 2019-02-22 NOTE — PROGRESS NOTES
Birth control discussion     Options for birth control with the risk and benefits discussed in detail     1  Combination medications    Pill / patch / ring     Regular they cycle, stop pregnancy but not protect against std    The risk of nausea and vomiting     Risk of blood clot discussed     2  Depo-provera   Good compliance since q 12 weeks   But could cause irregular bleeding weight gain and hair loss   Irreversible bone loss and cant use greater then 3 years  3  IUD    discussed hormonal and non hormonal    Risk of irregular bleeding    Infection increase if not in a monogamous relationship and painful insertion     4    Nexplanon   Discussed that it is progesterone    Advised that the cycles will be irregular for a few months   Advised that it is for 3 years   Does not cause bone loss like the depo provera or decline in estrogen    No protection for STD       After discussion     She removed the iud in nov and does not want replaced  She tried the pills and not working all that well for her  Decided on the nexplanon ordered will come in march for insertion

## 2019-03-08 ENCOUNTER — OFFICE VISIT (OUTPATIENT)
Dept: OBGYN CLINIC | Facility: CLINIC | Age: 29
End: 2019-03-08
Payer: COMMERCIAL

## 2019-03-08 VITALS
SYSTOLIC BLOOD PRESSURE: 120 MMHG | BODY MASS INDEX: 24.35 KG/M2 | DIASTOLIC BLOOD PRESSURE: 84 MMHG | HEIGHT: 61 IN | WEIGHT: 129 LBS

## 2019-03-08 DIAGNOSIS — Z30.017 ENCOUNTER FOR INITIAL PRESCRIPTION OF NEXPLANON: Primary | ICD-10-CM

## 2019-03-08 PROCEDURE — 11981 INSERTION DRUG DLVR IMPLANT: CPT | Performed by: OBSTETRICS & GYNECOLOGY

## 2019-03-08 NOTE — PATIENT INSTRUCTIONS
Etonogestrel (Implant)   Etonogestrel (e-toe-nikolai-SALMA-trel)  Prevents pregnancy  Brand Name(s): Nexplanon   There may be other brand names for this medicine  When This Medicine Should Not Be Used: This medicine is not right for everyone  You should not receive it if you had an allergic reaction to etonogestrel, or if you are pregnant  Do not use it if you have breast cancer, heart disease, liver disease, or a history of blood clots (such as deep vein thrombosis, pulmonary embolism, or stroke)  How to Use This Medicine:   Implant  · A nurse or other trained health professional will give you this medicine  · This medicine is an implant  It will be surgically placed under the skin of your upper, inner arm  · Gently press your fingertips over the skin where this medicine was inserted  You should be able to feel the implant  · You might have to use another form of birth control for 7 days after the implant is inserted  Your doctor will tell you if this is needed  · Your doctor can remove the implant at any time if you want to stop using this medicine  The implant must be removed after 3 years, but you may have a new one inserted if you still want to use this form of birth control  · Read and follow the patient instructions that come with this medicine  Talk to your doctor or pharmacist if you have any questions  Drugs and Foods to Avoid:   Ask your doctor or pharmacist before using any other medicine, including over-the-counter medicines, vitamins, and herbal products  · Some foods and medicines can affect how etonogestrel works  Tell your doctor if you are using any of the following:  ¨ Bosentan, carbamazepine, cyclosporine, felbamate, griseofulvin, itraconazole, ketoconazole, lamotrigine, oxcarbazepine, phenobarbital, phenytoin, rifampin, Fabiola wort, topiramate  ¨ Medicine for HIV/AIDS  Warnings While Using This Medicine:   · Tell your doctor right away if you think you become pregnant   The implant will need to be removed  · Tell your doctor if you have cancer, blood circulation problems, high blood pressure, or kidney disease, or if you smoke  Tell your doctor if you are breastfeeding, or if you have recently given birth  Also tell your doctor if you have diabetes or prediabetes, high cholesterol, or a history of depression  · This medicine may cause the following problems:  ¨ Ectopic (tubal) pregnancy  ¨ Cysts in the ovaries  ¨ Possible risk of breast cancer  ¨ Higher risk of heart attack, stroke, or blood clots  ¨ Liver cancers or tumors  ¨ High blood pressure  · This medicine may change your usual menstrual cycle  You might have irregular bleeding, or your periods may be lighter, shorter, heavier, or longer  You might not have a period in some cycles  However, call your doctor if you think you are pregnant or if you have severe pain or changes that worry you  · This medicine will not protect you from HIV/AIDS or other sexually transmitted diseases  · You might need to have the implant removed if you will be inactive for a period of time, such as after major surgery, because of the risk of blood clots  · Tell any doctor or dentist who treats you that you are using this medicine  This medicine may affect certain medical test results  · Your doctor will check your progress and the effects of this medicine at regular visits  Keep all appointments  · Keep all medicine out of the reach of children  Never share your medicine with anyone    Possible Side Effects While Using This Medicine:   Call your doctor right away if you notice any of these side effects:  · Allergic reaction: Itching or hives, swelling in your face or hands, swelling or tingling in your mouth or throat, chest tightness, trouble breathing  · Chest pain, trouble breathing, or coughing up blood  · Dark urine or pale stools, nausea, vomiting, loss of appetite, stomach pain, yellow skin or eyes  · Double vision or other trouble seeing  · Numbness or weakness on one side of your body, sudden or severe headache, problems with vision, speech, or walking  · Pain in your lower leg (calf)  · Severe or ongoing pain, tingling, bleeding, bruising, redness, itching, or swelling where the implant is placed  · Unusual or severe pain in your abdomen  · Unusual or unexpected vaginal bleeding or heavy bleeding  If you notice these less serious side effects, talk with your doctor:   · Acne or pimples  · Mild headache  · Mild pain, tingling, bleeding, bruising, redness, itching, or swelling where the implant is placed  · Mood changes  · Weight gain  If you notice other side effects that you think are caused by this medicine, tell your doctor  Call your doctor for medical advice about side effects  You may report side effects to FDA at 2-727-FDA-2858  © 2017 [employer] Information is for End User's use only and may not be sold, redistributed or otherwise used for commercial purposes  The above information is an  only  It is not intended as medical advice for individual conditions or treatments  Talk to your doctor, nurse or pharmacist before following any medical regimen to see if it is safe and effective for you

## 2019-03-08 NOTE — PROGRESS NOTES
Remove and insert drug implant  Date/Time: 3/8/2019 9:22 AM  Performed by: Vini Grimaldo MD  Authorized by: Vini Grimaldo MD     Consent:     Consent obtained:  Verbal and written    Consent given by:  Patient    Procedural risks discussed:  Bleeding, failure rate and repeat procedure    Patient questions answered: yes      Patient agrees, verbalizes understanding, and wants to proceed: yes      Educational handouts given: yes      Instructions and paperwork completed: yes    Universal Protocol:     Patient states understanding of procedure being performed: yes      Relevant documents present and verified: yes      Test results available and properly labeled: yes      Required blood products, implants, devices, and special equipment available: yes      Site marked: yes    Indication:     Indication: Insertion of non-biodegradable drug delivery implant    Pre-procedure:     Prepped with: chlorhexidine gluconate      Local anesthetic:  Lidocaine 1%    The site was cleaned and prepped in a sterile fashion: yes    Procedure:     Procedure:   Insertion    Small stab incision was made in arm: no      Left/right:  Left    Preloaded contraceptive capsule trocar was placed subdermally: yes      Visualization of implant was obtained: yes      Contraceptive capsule was inserted and trocar removed: yes      Visualization of notch in stylet and palpation of device: yes      Palpation confirms placement by provider and patient: yes      Site was closed with steri-strips and pressure bandage applied: yes    Comments:      Explained to the patient again about the device and the need for back up   Advised again of the irregular bleeding   Agreed and inserted without difficulty

## 2019-09-05 ENCOUNTER — PROCEDURE VISIT (OUTPATIENT)
Dept: OBGYN CLINIC | Facility: CLINIC | Age: 29
End: 2019-09-05
Payer: COMMERCIAL

## 2019-09-05 VITALS
BODY MASS INDEX: 23.83 KG/M2 | HEIGHT: 61 IN | SYSTOLIC BLOOD PRESSURE: 120 MMHG | DIASTOLIC BLOOD PRESSURE: 80 MMHG | WEIGHT: 126.2 LBS

## 2019-09-05 DIAGNOSIS — Z30.46 NEXPLANON REMOVAL: Primary | ICD-10-CM

## 2019-09-05 DIAGNOSIS — Z30.09 BIRTH CONTROL COUNSELING: ICD-10-CM

## 2019-09-05 PROCEDURE — 11982 REMOVE DRUG IMPLANT DEVICE: CPT | Performed by: OBSTETRICS & GYNECOLOGY

## 2019-09-05 NOTE — PATIENT INSTRUCTIONS
Etonogestrel/Ethinyl Estradiol (Into the vagina)   Ethinyl Estradiol (ETH-i-nil es-tra-DYE-ol), Etonogestrel (e-toe-nikolai-SALMA-trel)  Prevents pregnancy  Brand Name(s): NuvaRing   There may be other brand names for this medicine  When This Medicine Should Not Be Used: This medicine is not right for everyone  Do not use it if you had an allergic reaction to etonogestrel or ethinyl estradiol, if you are pregnant, or if you have vaginal bleeding that has not been checked by a doctor, liver disease or tumors, breast cancer, problems with blood clots, or certain heart problems  How to Use This Medicine: Insert  · This medicine is in a ring that is put into your vagina  Your doctor or nurse will show you how to put in the ring  The ring is left in place for 3 weeks then removed and another inserted 1 week later  During the week without the ring, you will usually have your menstrual period  Follow directions about how to use the ring, and ask your doctor if you have questions  · The first time you start using the ring, you should also use a second form of birth control during the first 7 days to avoid pregnancy  Do not use a diaphragm, because the ring may affect how the diaphragm fits  · Read and follow the patient instructions that come with this medicine  Talk to your doctor or pharmacist if you have any questions  · Missed dose:   ¨ If NuvaRing® has slipped out and it has been out for less than 3 hours, rinse it in cool or lukewarm water and reinsert it  You should still be protected from pregnancy  If L[employer] Lightning has been out for more than 3 hours, insert a new ring  You must use an extra method of birth control until the [place] Lightning has been in place for 7 days in a row  Do not use a diaphragm  ¨ If you leave the vaginal ring in place for more than 4 weeks, you may not be protected from pregnancy  Make sure that you are not pregnant before you insert a new ring   You must use an additional form of birth control (not a diaphragm) until the new ring has been in place for 7 days in a row  ¨ If you forget to insert a new ring after the ring-free week, call your doctor for instructions  · Store the medicine in a closed container at room temperature, away from heat, moisture, and direct light  Place the used NuvaRing® in the re-sealable foil pouch and put it in the trash where children and pets cannot get to it  Do not flush the ring down the toilet  Drugs and Foods to Avoid:   Ask your doctor or pharmacist before using any other medicine, including over-the-counter medicines, vitamins, and herbal products  · Some foods and medicines can affect how this medicine works  Tell your doctor if you are also using Fabiola's wort, acetaminophen, ascorbic acid, aprepitant, atorvastatin, boceprevir, bosentan, carbamazepine, clofibric acid, cyclosporine, felbamate, griseofulvin, lamotrigine, morphine, oxcarbazepine, phenobarbital, phenytoin, prednisolone, rifabutin, rifampicin, rufinamide, salicylic acid, telaprevir, temazepam, theophylline, tizanidine, or topiramate  · Also tell your doctor if you are using medicine to treat an infection (such as fluconazole, itraconazole, ketoconazole, miconazole, voriconazole), medicine to treat HIV, or thyroid medicines  · Do not eat grapefruit or drink grapefruit juice while you are using this medicine  Warnings While Using This Medicine:   · It is not safe to take this medicine during pregnancy  It could harm an unborn baby  Tell your doctor right away if you become pregnant  · Tell your doctor if you are breastfeeding, or if you recently had a baby, miscarriage, or   Tell your doctor if you have kidney disease, cervical cancer, diabetes, epilepsy, migraines, heart or blood vessel disease, high cholesterol, or high blood pressure  Also tell your doctor if you have a history of depression, chloasma gravidarum, jaundice during pregnancy, or toxic shock syndrome, or if you smoke    · This medicine may cause the following problems:  ¨ Higher risk of heart attack, stroke, or blood clots  ¨ Liver problems  ¨ High blood pressure  ¨ Gallbladder disease  ¨ Possible increased risk of breast or cervical cancer  · Tell any doctor who treats you that you are using this medicine  You may need to stop using this medicine before and after you have surgery, because of the risk of blood clots  · This medicine will not protect you from HIV/AIDS or other sexually transmitted infections  · Tell any doctor or dentist who treats you that you are using this medicine  This medicine may affect certain medical test results  · Your doctor will check the effects of this medicine at regular visits  Keep all appointments  · Keep all medicine out of the reach of children  Never share your medicine with anyone  Possible Side Effects While Using This Medicine:   Call your doctor right away if you notice any of these side effects:  · Allergic reaction: Itching or hives, swelling in your face or hands, swelling or tingling in your mouth or throat, chest tightness, trouble breathing  · Chest pain that may spread, trouble breathing, unusual sweating, fainting  · Dark urine or pale stools, nausea, vomiting, loss of appetite, stomach pain, yellow skin or eyes  · Fast, slow, or pounding heartbeat  · Numbness or weakness on one side of your body, pain in your lower leg, sudden or severe headache, problems with vision, speech, or walking  · Redness, pain, itching, or burning sensation inside your vagina  · Sudden high fever, diarrhea, dizziness, fainting, muscle aches, or a sunburn-like rash  · Unusual or unexpected vaginal bleeding or heavy bleeding  · Vision loss, double vision  If you notice these less serious side effects, talk with your doctor:   · Breast tenderness or swelling  · Depression, mood changes  If you notice other side effects that you think are caused by this medicine, tell your doctor     Call your doctor for medical advice about side effects  You may report side effects to FDA at 7-921-FDA-4068  © 2017 [employer] Information is for End User's use only and may not be sold, redistributed or otherwise used for commercial purposes  The above information is an  only  It is not intended as medical advice for individual conditions or treatments  Talk to your doctor, nurse or pharmacist before following any medical regimen to see if it is safe and effective for you

## 2019-09-05 NOTE — PROGRESS NOTES
Assessment/Plan   Diagnoses and all orders for this visit:    Nexplanon removal  -     Remove and insert drug implant     1  Nexplanon removal-done without problems  Patient tolerated the procedure well  She will watch for signs or symptoms of infection and call or return as needed  She does work at the [facility] [employer] and she can be evaluated there as needed  2  Contraception-patient previously with IUD, Nexplanon, Depo-Provera all with side effects  She does not really like the pill as well  She is not currently in a relationship so contraception is not mandatory at this time  Strong suggested condoms should she become involved with someone  She was given the contraceptive options sheet  She was counseled about other options including NuvaRing and the patch  She will consider this  Otherwise, follow-up after 19 for yearly exam or as needed  Subjective   Patient ID: Cesar Parra is a 30 y o  female  Vitals:    19 0717   BP: 120/80     HPI    The following portions of the patient's history were reviewed and updated as appropriate: allergies, current medications, past family history, past medical history, past social history, past surgical history and problem list   Past Medical History:   Diagnosis Date    Abnormal Pap smear of cervix     HPV (human papilloma virus) infection      History reviewed  No pertinent surgical history    OB History    Para Term  AB Living   1 1 1     1   SAB TAB Ectopic Multiple Live Births           1      # Outcome Date GA Lbr Harley/2nd Weight Sex Delivery Anes PTL Lv   1 Term                Current Outpatient Medications:     polymyxin b-trimethoprim (POLYTRIM) ophthalmic solution, Administer 1 drop to both eyes every 6 (six) hours (Patient not taking: Reported on 2019), Disp: 10 mL, Rfl: 0  No Known Allergies  Social History     Socioeconomic History    Marital status: Single     Spouse name: None    Number of children: None    Years of education: None    Highest education level: None   Occupational History    None   Social Needs    Financial resource strain: None    Food insecurity:     Worry: None     Inability: None    Transportation needs:     Medical: None     Non-medical: None   Tobacco Use    Smoking status: Never Smoker    Smokeless tobacco: Never Used   Substance and Sexual Activity    Alcohol use: Yes     Comment: socailly    Drug use: No     Comment: No illicit drug use    Sexual activity: Yes     Partners: Male     Birth control/protection: None   Lifestyle    Physical activity:     Days per week: None     Minutes per session: None    Stress: None   Relationships    Social connections:     Talks on phone: None     Gets together: None     Attends Rastafarian service: None     Active member of club or organization: None     Attends meetings of clubs or organizations: None     Relationship status: None    Intimate partner violence:     Fear of current or ex partner: None     Emotionally abused: None     Physically abused: None     Forced sexual activity: None   Other Topics Concern    None   Social History Narrative    Caffeine use     Family History   Problem Relation Age of Onset    Diabetes type II Mother         Controlled type 2 diabetes mellitus       Review of Systems    Objective   Physical Exam    Objective      /80 (BP Location: Left arm, Patient Position: Sitting, Cuff Size: Standard)   Ht 5' 1" (1 549 m)   Wt 57 2 kg (126 lb 3 2 oz)   LMP  (LMP Unknown)   BMI 23 85 kg/m²     General:   alert and oriented, in no acute distress   Neck:    Breast:    Heart:    Lungs:    Abdomen: soft, non-tender, without masses or organomegaly   Vulva:    Vagina:    Cervix:    Uterus:    Adnexa:    Rectum:     Psych:  Normal mood and affect   Skin:  Without obvious lesions  Nexplanon in good position in left arm, removed without incident     Eyes: symmetric, with normal movements and reactivity   Musculoskeletal: Normal muscle tone and movements appreciated

## 2019-09-05 NOTE — PROGRESS NOTES
Remove and insert drug implant  Date/Time: 9/5/2019 7:37 AM  Performed by: Hien Germain MD  Authorized by: Hine Germain MD     Consent:     Consent obtained:  Verbal and written    Consent given by:  Patient    Procedural risks discussed:  Bleeding, damage to other organs, failure rate, infection and possible continued pain    Patient questions answered: yes      Patient agrees, verbalizes understanding, and wants to proceed: yes      Educational handouts given: yes      Instructions and paperwork completed: yes    Indication:     Indication: Presence of non-biodegradable drug delivery implant    Pre-procedure:     Pre-procedure timeout performed: no      Prepped with: povidone-iodine      Local anesthetic:  Lidocaine 1%    The site was cleaned and prepped in a sterile fashion: yes    Procedure:     Procedure:  Removal    Small stab incision was made in arm: yes      Left/right:  Left    Visualization of implant was obtained: yes      Site was closed with steri-strips and pressure bandage applied: yes    Comments:      Removal done without problems, approximately 3-4 minutes  Patient tolerated well  Steri-Strips followed by pressure bandage was placed

## 2019-10-07 ENCOUNTER — OFFICE VISIT (OUTPATIENT)
Dept: FAMILY MEDICINE CLINIC | Facility: CLINIC | Age: 29
End: 2019-10-07
Payer: COMMERCIAL

## 2019-10-07 VITALS
TEMPERATURE: 98.8 F | DIASTOLIC BLOOD PRESSURE: 76 MMHG | SYSTOLIC BLOOD PRESSURE: 118 MMHG | WEIGHT: 124 LBS | HEIGHT: 61 IN | HEART RATE: 92 BPM | BODY MASS INDEX: 23.41 KG/M2 | OXYGEN SATURATION: 98 %

## 2019-10-07 DIAGNOSIS — F41.9 ANXIETY: Primary | ICD-10-CM

## 2019-10-07 DIAGNOSIS — R45.86 MOOD CHANGES: ICD-10-CM

## 2019-10-07 PROBLEM — H10.13 ALLERGIC CONJUNCTIVITIS OF BOTH EYES: Status: RESOLVED | Noted: 2017-05-11 | Resolved: 2019-10-07

## 2019-10-07 PROBLEM — H10.33 ACUTE BACTERIAL CONJUNCTIVITIS OF BOTH EYES: Status: RESOLVED | Noted: 2019-01-02 | Resolved: 2019-10-07

## 2019-10-07 PROCEDURE — 3008F BODY MASS INDEX DOCD: CPT | Performed by: NURSE PRACTITIONER

## 2019-10-07 PROCEDURE — 99214 OFFICE O/P EST MOD 30 MIN: CPT | Performed by: NURSE PRACTITIONER

## 2019-10-07 RX ORDER — ESCITALOPRAM OXALATE 5 MG/1
5 TABLET ORAL DAILY
Qty: 30 TABLET | Refills: 0 | Status: SHIPPED | OUTPATIENT
Start: 2019-10-07 | End: 2019-11-08 | Stop reason: SDUPTHER

## 2019-10-07 NOTE — PATIENT INSTRUCTIONS
Complete lab work, this does not need to be fasting  Try to outlet any anxiety or stress with lifestyle changes such as increasing exercise, meditation, yoga, coloring, or writing in a journal    Decrease caffeine and alcohol intake  Look into therapist/counselor to speak with  Start lexapro, this is 5 mg daily in the morning  Monitor for any side effects  See handout below for medication information  Follow up with Jerry Chao in 3-4 weeks  Please call the office if you are experiencing any worsening of symptoms or no symptom improvement  Generalized Anxiety Disorder   AMBULATORY CARE:   Generalized anxiety disorder (LEEANNE)  is a condition that causes you to worry more than normal  It also causes you to feel fear in most situations  You are worried or afraid even without a cause  You may worry about your health, job, money, and relationships  It is hard for you to control your worry and feel calm  LEEANNE prevents you from doing daily activities  It may also prevent you from spending time with family and friends  Without treatment, your anxiety may get worse  Common signs and symptoms that may occur with anxiety:   · Fatigue or muscle tightness     · Shaking, restlessness, or irritability     · Problems focusing     · Trouble sleeping     · Feeling jumpy, easily startled, or dizzy     · Nausea, vomiting, diarrhea, or sweating    · Rapid heartbeat or shortness of breath  Call 911 for any of the following:   · You have chest pain, tightness, or heaviness that may spread to your shoulders, arms, jaw, neck, or back  · You feel like hurting yourself or someone else  Contact your healthcare provider if:   · Your symptoms get worse or do not get better with treatment  · Your anxiety keeps you from doing your regular daily activities  · You have new symptoms since your last visit  · You have questions or concerns about your condition or care    Treatment for LEEANNE  may include medicines to help you feel calm and relaxed, and decrease your symptoms  Medicines are usually given together with therapy or other treatments  Manage anxiety:   · Talk to someone about your anxiety  Your healthcare provider may suggest counseling  Cognitive behavioral therapy can help you understand and change how you react to events  It can also help you understand what triggers your symptoms  You might feel more comfortable talking with a friend or family member about your anxiety  Choose someone you know will be supportive and encouraging  · Keep a journal of your symptoms  Write down what you were doing before your symptoms started  Also write down what made the anxiety better or worse  Bring this journal with you to your follow-up appointments  · Find ways to relax  Activities such as yoga, meditation, or listening to music can help you relax  Spend time with friends, or do things you enjoy  · Practice deep breathing  Deep breathing can help you relax when you feel anxious  Focus on taking slow, deep breaths several times a day, or during an anxiety attack  Slowly breathe in through your nose  Pause, then slowly breathe out through your mouth  Try to breathe out longer than you breathed in  · Create a regular sleep routine  Regular sleep can help you feel calmer during the day  Go to sleep and wake up at the same times every day  Do not watch television or use the computer right before bed  Your room should be comfortable, dark, and quiet  · Eat a variety of healthy foods  Healthy foods include fruits, vegetables, low-fat dairy products, lean meats, fish, whole-grain breads, and cooked beans  Healthy foods can help you feel less anxious and have more energy  · Exercise regularly  Exercise can increase your energy level  Exercise may also lift your mood and help you sleep better  Your healthcare provider can help you create an exercise plan  · Do not smoke    Nicotine and other chemicals in cigarettes and cigars can increase anxiety  Ask your healthcare provider for information if you currently smoke and need help to quit  E-cigarettes or smokeless tobacco still contain nicotine  Talk to your healthcare provider before you use these products  · Do not have caffeine  Caffeine can make your symptoms worse  Do not have foods or drinks that are meant to increase your energy level  · Limit or do not drink alcohol  Ask your healthcare provider if alcohol is safe for you  Also ask how much is safe  You may not be able to drink alcohol if you take certain anxiety or depression medicines  · Do not use drugs  Drugs can make your anxiety worse  It can also make anxiety hard to manage  Talk to your healthcare provider if you use drugs and want help to quit  Follow up with your healthcare provider as directed:  Write down your questions so you remember to ask them during your visits  © 2017 [address] Information is for End User's use only and may not be sold, redistributed or otherwise used for commercial purposes  All illustrations and images included in CareNotes® are the copyrighted property of [employer]  or [employer] Manolo  The above information is an  only  It is not intended as medical advice for individual conditions or treatments  Talk to your doctor, nurse or pharmacist before following any medical regimen to see if it is safe and effective for you  Escitalopram (By mouth)   Escitalopram ([id number]-oh-pram)  Treats depression and generalized anxiety disorder (LEEANNE)  Brand Name(s): Lexapro   There may be other brand names for this medicine  When This Medicine Should Not Be Used: This medicine is not right for everyone  Do not use it if you had an allergic reaction to escitalopram or citalopram   How to Use This Medicine:   Liquid, Tablet  · Take this medicine as directed  You may need to take it for a month or more before you feel better   Your dose may need to be changed to find out what works best for you  · Measure the oral liquid medicine with a marked measuring spoon, oral syringe, or medicine cup  · This medicine should come with a Medication Guide  Ask your pharmacist for a copy if you do not have one  · Missed dose: Take a dose as soon as you remember  If it is almost time for your next dose, wait until then and take a regular dose  Do not take extra medicine to make up for a missed dose  · Store the medicine in a closed container at room temperature, away from heat, moisture, and direct light  Drugs and Foods to Avoid:   Ask your doctor or pharmacist before using any other medicine, including over-the-counter medicines, vitamins, and herbal products  · Do not use this medicine together with pimozide  Do not use this medicine and an MAO inhibitor (MAOI) within 14 days of each other  · Some medicines can affect how escitalopram works  Tell your doctor if you are using the following:   ¨ Buspirone, carbamazepine, fentanyl, lithium, Fabiola's wort, tramadol, or tryptophan supplements  ¨ Amphetamines  ¨ Blood thinner (including warfarin)  ¨ Diuretic (water pill)  ¨ NSAID pain or arthritis medicine (including aspirin, celecoxib, diclofenac, ibuprofen, naproxen)  ¨ Triptan medicine to treat migraine headaches (including sumatriptan)  · Tell your doctor if you use anything else that makes you sleepy  Some examples are allergy medicine, narcotic pain medicine, and alcohol  · Do not drink alcohol while you are using this medicine  Warnings While Using This Medicine:   · Tell your doctor if you are pregnant or breastfeeding, or if you have kidney disease, liver disease, bleeding problems, glaucoma, heart disease, or a seizure disorder  · For some children, teenagers, and young adults, this medicine may increase mental or emotional problems  This may lead to thoughts of suicide and violence   Talk with your doctor right away if you have any thoughts or behavior changes that concern you  Tell your doctor if you or anyone in your family has a history of bipolar disorder or suicide attempts  · This medicine may cause the following problems:   ¨ Serotonin syndrome (more likely when taken with certain medicines)  ¨ Low sodium levels  ¨ Increased risk of bleeding problems  · This medicine may make you dizzy or drowsy  Do not drive or do anything that could be dangerous until you know how this medicine affects you  · Your doctor may want to monitor your child's weight and height, because this medicine may cause decreased appetite and weight loss in children  · Do not stop using this medicine suddenly  Your doctor will need to slowly decrease your dose before you stop it completely  · Your doctor will check your progress and the effects of this medicine at regular visits  Keep all appointments  · Keep all medicine out of the reach of children  Never share your medicine with anyone    Possible Side Effects While Using This Medicine:   Call your doctor right away if you notice any of these side effects:  · Allergic reaction: Itching or hives, swelling in your face or hands, swelling or tingling in your mouth or throat, chest tightness, trouble breathing  · Anxiety, restlessness, fever, sweating, muscle spasms, nausea, vomiting, diarrhea, seeing or hearing things that are not there  · Confusion, weakness, and muscle twitching  · Eye pain, vision changes, seeing halos around lights  · Fast, pounding, or uneven heartbeat  · Feeling more excited or energetic than usual, racing thoughts, trouble sleeping  · Seizures  · Thoughts of hurting yourself or others, unusual behavior  · Unusual bleeding or bruising  If you notice these less serious side effects, talk with your doctor:   · Dizziness, drowsiness, or sleepiness  · Dry mouth  · Headache  · Nausea, constipation, diarrhea  · Sexual problems  If you notice other side effects that you think are caused by this medicine, tell your doctor  Call your doctor for medical advice about side effects  You may report side effects to FDA at 6-938-FDA-1705  © 2017 [address] Information is for End User's use only and may not be sold, redistributed or otherwise used for commercial purposes  The above information is an  only  It is not intended as medical advice for individual conditions or treatments  Talk to your doctor, nurse or pharmacist before following any medical regimen to see if it is safe and effective for you

## 2019-10-07 NOTE — ASSESSMENT & PLAN NOTE
This was discussed at length with patient as well as conservative and pharmacological options for treatment  After thorough discussion patient is interested in starting medication  Discussed possible options with patient, she agreed to try lexapro, will start 5 mg daily, follow up with her PCP in 3 weeks  Patient denies SI/HI  Patient advised on side effects of Lexapro and when to call  Handout provided  Spent >30 minutes with patient and >50% of time spent counseling  Handout on anxiety provided  Encouraged her to look into therapist/ counselor which she is open to doing  Discussed other lifestyle changes that can help with anxiety such as decrease caffeine as she states she drinks a lot of it, exercise, coloring, writing, reading  Please call the office if you are experiencing any worsening of symptoms or no symptom improvement

## 2019-10-07 NOTE — PROGRESS NOTES
Assessment/Plan:    Anxiety  This was discussed at length with patient as well as conservative and pharmacological options for treatment  After thorough discussion patient is interested in starting medication  Discussed possible options with patient, she agreed to try lexapro, will start 5 mg daily, follow up with her PCP in 3 weeks  Patient denies SI/HI  Patient advised on side effects of Lexapro and when to call  Handout provided  Spent >30 minutes with patient and >50% of time spent counseling  Handout on anxiety provided  Encouraged her to look into therapist/ counselor which she is open to doing  Discussed other lifestyle changes that can help with anxiety such as decrease caffeine as she states she drinks a lot of it, exercise, coloring, writing, reading  Please call the office if you are experiencing any worsening of symptoms or no symptom improvement  Lab work ordered to be updated and to evaluate any possible causes of her symptoms  Follow up in 3 weeks with PCP  Diagnoses and all orders for this visit:    Anxiety  -     CBC and differential; Future  -     Comprehensive metabolic panel; Future  -     TSH, 3rd generation with Free T4 reflex; Future  -     Vitamin D 25 hydroxy; Future  -     escitalopram (LEXAPRO) 5 mg tablet; Take 1 tablet (5 mg total) by mouth daily    Mood changes  -     CBC and differential; Future  -     Comprehensive metabolic panel; Future  -     TSH, 3rd generation with Free T4 reflex; Future  -     Vitamin D 25 hydroxy; Future      Patient verbalizes understand and agrees with treatment plan  Subjective:        Patient ID: Chacha Milton is a 30 y o  female  Chief Complaint   Patient presents with   Levie Screen     pt states she gets irritated fast, wakes up not happy; no SI/HI; depression screening negative       Patient presents to the office today for evaluation of mood changes   She states that over the past few months she's noticed she's more irritable and in a bad mood more frequently  She states initially it was just a few times a month but now it's a few times a week  She states she wakes up in a poor mood  She denies depression  Denies SI/HI  Patient states she is confused because everything in her life is good right now and she doesn't feel there is anything to be upset about  She does feel overwhelmed a lot of the times with her busy schedule  She states no one in her family has any hx of mental illness that she knows of  Patient denies any chance of pregnancy  TSH last checked in 2017  Trauma history does include death of her younger brother 2 years ago, she does not think this is related  She states she has a great support system and is open with them and discusses these feelings with them  She states she drinks a lot of caffeine through the day  She states she sleeps well  The following portions of the patient's history were reviewed and updated as appropriate: allergies, current medications, past family history, past social history and problem list     Review of Systems   Constitutional: Negative for chills and fever  HENT: Negative for congestion  Eyes: Negative for pain and visual disturbance  Respiratory: Negative for cough and shortness of breath  Cardiovascular: Negative for chest pain, palpitations and leg swelling  Gastrointestinal: Negative for abdominal pain, diarrhea, nausea and vomiting  Genitourinary: Negative for difficulty urinating and dysuria  Musculoskeletal: Negative for arthralgias and myalgias  Skin: Negative for color change and rash  Neurological: Negative for dizziness, syncope, numbness and headaches  Hematological: Negative for adenopathy  Psychiatric/Behavioral: Negative for agitation, behavioral problems, confusion, self-injury, sleep disturbance and suicidal ideas  The patient is nervous/anxious  The patient is not hyperactive            Objective:  /76 (BP Location: Left arm, Patient Position: Sitting, Cuff Size: Standard)   Pulse 92   Temp 98 8 °F (37 1 °C) (Temporal)   Ht 5' 1" (1 549 m)   Wt 56 2 kg (124 lb)   SpO2 98%   BMI 23 43 kg/m²      Physical Exam   Constitutional: She is oriented to person, place, and time  She appears well-developed and well-nourished  No distress  HENT:   Head: Normocephalic and atraumatic  Right Ear: External ear normal    Left Ear: External ear normal    Nose: Nose normal    Eyes: Conjunctivae and lids are normal  Right eye exhibits no discharge  Left eye exhibits no discharge  Neck: Normal range of motion  Neck supple  No tracheal deviation present  Cardiovascular: Normal rate and regular rhythm  No murmur heard  Pulmonary/Chest: Effort normal and breath sounds normal  No respiratory distress  She has no wheezes  Musculoskeletal: Normal range of motion  She exhibits no edema or deformity  Lymphadenopathy:     She has no cervical adenopathy  Neurological: She is alert and oriented to person, place, and time  Coordination normal    Skin: Skin is warm and dry  No rash noted  She is not diaphoretic  No erythema  Psychiatric: Her speech is normal and behavior is normal  Judgment and thought content normal  Her mood appears anxious  Thought content is not paranoid and not delusional  Cognition and memory are normal  She expresses no homicidal and no suicidal ideation  She expresses no suicidal plans and no homicidal plans  Patient anxious and tearful through visit, easily consoled    Nursing note and vitals reviewed

## 2019-10-07 NOTE — Clinical Note
Rodolfo Brown, I saw Lily Oliva in the office today for anxiety  She was started on lexapro 5 mg, she will be following up with you in 3-4 weeks  Labs were ordered to be updated as well  Let me know if you have any questions   Thanks,Ayana

## 2019-10-08 ENCOUNTER — APPOINTMENT (OUTPATIENT)
Dept: LAB | Facility: CLINIC | Age: 29
End: 2019-10-08
Payer: COMMERCIAL

## 2019-10-08 DIAGNOSIS — F41.9 ANXIETY: ICD-10-CM

## 2019-10-08 DIAGNOSIS — R45.86 MOOD CHANGES: ICD-10-CM

## 2019-10-08 LAB
25(OH)D3 SERPL-MCNC: 18.6 NG/ML (ref 30–100)
ALBUMIN SERPL BCP-MCNC: 4.4 G/DL (ref 3.5–5)
ALP SERPL-CCNC: 54 U/L (ref 46–116)
ALT SERPL W P-5'-P-CCNC: 22 U/L (ref 12–78)
ANION GAP SERPL CALCULATED.3IONS-SCNC: 7 MMOL/L (ref 4–13)
AST SERPL W P-5'-P-CCNC: 16 U/L (ref 5–45)
BASOPHILS # BLD AUTO: 0.05 THOUSANDS/ΜL (ref 0–0.1)
BASOPHILS NFR BLD AUTO: 1 % (ref 0–1)
BILIRUB SERPL-MCNC: 0.53 MG/DL (ref 0.2–1)
BUN SERPL-MCNC: 8 MG/DL (ref 5–25)
CALCIUM SERPL-MCNC: 9.2 MG/DL (ref 8.3–10.1)
CHLORIDE SERPL-SCNC: 108 MMOL/L (ref 100–108)
CO2 SERPL-SCNC: 25 MMOL/L (ref 21–32)
CREAT SERPL-MCNC: 0.63 MG/DL (ref 0.6–1.3)
EOSINOPHIL # BLD AUTO: 0.45 THOUSAND/ΜL (ref 0–0.61)
EOSINOPHIL NFR BLD AUTO: 7 % (ref 0–6)
ERYTHROCYTE [DISTWIDTH] IN BLOOD BY AUTOMATED COUNT: 12.3 % (ref 11.6–15.1)
GFR SERPL CREATININE-BSD FRML MDRD: 122 ML/MIN/1.73SQ M
GLUCOSE P FAST SERPL-MCNC: 83 MG/DL (ref 65–99)
HCT VFR BLD AUTO: 41.2 % (ref 34.8–46.1)
HGB BLD-MCNC: 13.3 G/DL (ref 11.5–15.4)
IMM GRANULOCYTES # BLD AUTO: 0.01 THOUSAND/UL (ref 0–0.2)
IMM GRANULOCYTES NFR BLD AUTO: 0 % (ref 0–2)
LYMPHOCYTES # BLD AUTO: 2.29 THOUSANDS/ΜL (ref 0.6–4.47)
LYMPHOCYTES NFR BLD AUTO: 34 % (ref 14–44)
MCH RBC QN AUTO: 29 PG (ref 26.8–34.3)
MCHC RBC AUTO-ENTMCNC: 32.3 G/DL (ref 31.4–37.4)
MCV RBC AUTO: 90 FL (ref 82–98)
MONOCYTES # BLD AUTO: 0.4 THOUSAND/ΜL (ref 0.17–1.22)
MONOCYTES NFR BLD AUTO: 6 % (ref 4–12)
NEUTROPHILS # BLD AUTO: 3.62 THOUSANDS/ΜL (ref 1.85–7.62)
NEUTS SEG NFR BLD AUTO: 52 % (ref 43–75)
NRBC BLD AUTO-RTO: 0 /100 WBCS
PLATELET # BLD AUTO: 195 THOUSANDS/UL (ref 149–390)
PMV BLD AUTO: 12.6 FL (ref 8.9–12.7)
POTASSIUM SERPL-SCNC: 3.8 MMOL/L (ref 3.5–5.3)
PROT SERPL-MCNC: 7.4 G/DL (ref 6.4–8.2)
RBC # BLD AUTO: 4.59 MILLION/UL (ref 3.81–5.12)
SODIUM SERPL-SCNC: 140 MMOL/L (ref 136–145)
TSH SERPL DL<=0.05 MIU/L-ACNC: 0.67 UIU/ML (ref 0.36–3.74)
WBC # BLD AUTO: 6.82 THOUSAND/UL (ref 4.31–10.16)

## 2019-10-08 PROCEDURE — 36415 COLL VENOUS BLD VENIPUNCTURE: CPT

## 2019-10-08 PROCEDURE — 85025 COMPLETE CBC W/AUTO DIFF WBC: CPT

## 2019-10-08 PROCEDURE — 84443 ASSAY THYROID STIM HORMONE: CPT

## 2019-10-08 PROCEDURE — 82306 VITAMIN D 25 HYDROXY: CPT

## 2019-10-08 PROCEDURE — 80053 COMPREHEN METABOLIC PANEL: CPT

## 2019-11-08 DIAGNOSIS — F41.9 ANXIETY: ICD-10-CM

## 2019-11-08 RX ORDER — ESCITALOPRAM OXALATE 5 MG/1
5 TABLET ORAL DAILY
Qty: 30 TABLET | Refills: 0 | Status: CANCELLED | OUTPATIENT
Start: 2019-11-08

## 2019-11-08 RX ORDER — ESCITALOPRAM OXALATE 5 MG/1
5 TABLET ORAL DAILY
Qty: 30 TABLET | Refills: 0 | Status: SHIPPED | OUTPATIENT
Start: 2019-11-08 | End: 2020-01-21 | Stop reason: SDUPTHER

## 2019-11-08 NOTE — TELEPHONE ENCOUNTER
Looks like pt saw our NP, Christine Naqvi in our office on 10/07/2019 regarding anxiety  She was to follow up with your office 3 weeks from visit, pt is requesting a refill of Lexapro  Please authorize  No appnt scheduled with your office

## 2019-11-08 NOTE — TELEPHONE ENCOUNTER
Please make sure the patient has a visit set up with us to follow-up on her Lexapro  I will send in a refill at this time, but we need to see her back in the office for a follow-up

## 2019-11-08 NOTE — TELEPHONE ENCOUNTER
I called Isabell GASTON for her to call the office back regarding her making a follow up ov with Star regarding her medication

## 2019-12-18 ENCOUNTER — APPOINTMENT (OUTPATIENT)
Dept: LAB | Facility: HOSPITAL | Age: 29
End: 2019-12-18
Payer: COMMERCIAL

## 2019-12-18 DIAGNOSIS — Z72.51 HIGH RISK HETEROSEXUAL BEHAVIOR: Primary | ICD-10-CM

## 2019-12-18 DIAGNOSIS — Z72.51 HIGH RISK HETEROSEXUAL BEHAVIOR: ICD-10-CM

## 2019-12-18 LAB
BACTERIA UR QL AUTO: ABNORMAL /HPF
BILIRUB UR QL STRIP: NEGATIVE
CLARITY UR: ABNORMAL
COLOR UR: YELLOW
GLUCOSE UR STRIP-MCNC: NEGATIVE MG/DL
HGB UR QL STRIP.AUTO: NEGATIVE
KETONES UR STRIP-MCNC: NEGATIVE MG/DL
LEUKOCYTE ESTERASE UR QL STRIP: 500
MUCOUS THREADS UR QL AUTO: ABNORMAL
NITRITE UR QL STRIP: NEGATIVE
NON-SQ EPI CELLS URNS QL MICRO: ABNORMAL /HPF
PH UR STRIP.AUTO: 6 [PH]
PROT UR STRIP-MCNC: NEGATIVE MG/DL
RBC #/AREA URNS AUTO: ABNORMAL /HPF
SP GR UR STRIP.AUTO: 1.02 (ref 1–1.04)
UROBILINOGEN UA: NEGATIVE MG/DL
WBC #/AREA URNS AUTO: ABNORMAL /HPF

## 2019-12-18 PROCEDURE — 86592 SYPHILIS TEST NON-TREP QUAL: CPT

## 2019-12-18 PROCEDURE — 86696 HERPES SIMPLEX TYPE 2 TEST: CPT

## 2019-12-18 PROCEDURE — 36415 COLL VENOUS BLD VENIPUNCTURE: CPT

## 2019-12-18 PROCEDURE — 81003 URINALYSIS AUTO W/O SCOPE: CPT | Performed by: OBSTETRICS & GYNECOLOGY

## 2019-12-18 PROCEDURE — 86695 HERPES SIMPLEX TYPE 1 TEST: CPT

## 2019-12-18 PROCEDURE — 87491 CHLMYD TRACH DNA AMP PROBE: CPT

## 2019-12-18 PROCEDURE — 87389 HIV-1 AG W/HIV-1&-2 AB AG IA: CPT

## 2019-12-18 PROCEDURE — 87340 HEPATITIS B SURFACE AG IA: CPT

## 2019-12-18 PROCEDURE — 87591 N.GONORRHOEAE DNA AMP PROB: CPT

## 2019-12-18 PROCEDURE — 81001 URINALYSIS AUTO W/SCOPE: CPT | Performed by: OBSTETRICS & GYNECOLOGY

## 2019-12-18 PROCEDURE — 87086 URINE CULTURE/COLONY COUNT: CPT | Performed by: OBSTETRICS & GYNECOLOGY

## 2019-12-19 LAB
BACTERIA UR CULT: NORMAL
C TRACH DNA SPEC QL NAA+PROBE: NEGATIVE
HBV SURFACE AG SER QL: NORMAL
HSV1 IGG SER IA-ACNC: 51.5 INDEX (ref 0–0.9)
HSV2 IGG SER IA-ACNC: <0.91 INDEX (ref 0–0.9)
N GONORRHOEA DNA SPEC QL NAA+PROBE: NEGATIVE
RPR SER QL: NORMAL

## 2019-12-20 LAB — HIV 1+2 AB+HIV1 P24 AG SERPL QL IA: NORMAL

## 2019-12-23 ENCOUNTER — TELEPHONE (OUTPATIENT)
Dept: OBGYN CLINIC | Facility: CLINIC | Age: 29
End: 2019-12-23

## 2019-12-27 ENCOUNTER — TELEPHONE (OUTPATIENT)
Dept: DERMATOLOGY | Facility: CLINIC | Age: 29
End: 2019-12-27

## 2020-01-05 ENCOUNTER — HOSPITAL ENCOUNTER (EMERGENCY)
Facility: HOSPITAL | Age: 30
Discharge: HOME | End: 2020-01-05
Attending: EMERGENCY MEDICINE | Admitting: EMERGENCY MEDICINE
Payer: COMMERCIAL

## 2020-01-05 VITALS
SYSTOLIC BLOOD PRESSURE: 105 MMHG | RESPIRATION RATE: 18 BRPM | DIASTOLIC BLOOD PRESSURE: 80 MMHG | WEIGHT: 129 LBS | BODY MASS INDEX: 24.37 KG/M2 | HEART RATE: 76 BPM | OXYGEN SATURATION: 98 % | TEMPERATURE: 98 F

## 2020-01-05 DIAGNOSIS — R19.7 NAUSEA VOMITING AND DIARRHEA: Primary | ICD-10-CM

## 2020-01-05 DIAGNOSIS — R11.2 NAUSEA VOMITING AND DIARRHEA: Primary | ICD-10-CM

## 2020-01-05 LAB
ANION GAP SERPL CALCULATED.3IONS-SCNC: 9 MMOL/L (ref 4–13)
BUN SERPL-MCNC: 10 MG/DL (ref 5–25)
CALCIUM SERPL-MCNC: 8.8 MG/DL (ref 8.3–10.1)
CHLORIDE SERPL-SCNC: 109 MMOL/L (ref 100–108)
CO2 SERPL-SCNC: 22 MMOL/L (ref 21–32)
CREAT SERPL-MCNC: 0.58 MG/DL (ref 0.6–1.3)
GFR SERPL CREATININE-BSD FRML MDRD: 125 ML/MIN/1.73SQ M
GLUCOSE SERPL-MCNC: 96 MG/DL (ref 65–140)
POTASSIUM SERPL-SCNC: 3.1 MMOL/L (ref 3.5–5.3)
SODIUM SERPL-SCNC: 140 MMOL/L (ref 136–145)

## 2020-01-05 PROCEDURE — 36415 COLL VENOUS BLD VENIPUNCTURE: CPT | Performed by: EMERGENCY MEDICINE

## 2020-01-05 PROCEDURE — 99284 EMERGENCY DEPT VISIT MOD MDM: CPT | Performed by: EMERGENCY MEDICINE

## 2020-01-05 PROCEDURE — 80048 BASIC METABOLIC PNL TOTAL CA: CPT | Performed by: EMERGENCY MEDICINE

## 2020-01-05 PROCEDURE — 99283 EMERGENCY DEPT VISIT LOW MDM: CPT

## 2020-01-05 RX ORDER — ONDANSETRON 4 MG/1
4 TABLET, ORALLY DISINTEGRATING ORAL ONCE
Status: COMPLETED | OUTPATIENT
Start: 2020-01-05 | End: 2020-01-05

## 2020-01-05 RX ORDER — ONDANSETRON 4 MG/1
4 TABLET, FILM COATED ORAL EVERY 6 HOURS
Qty: 12 TABLET | Refills: 0 | Status: SHIPPED | OUTPATIENT
Start: 2020-01-05 | End: 2020-02-20 | Stop reason: ALTCHOICE

## 2020-01-05 RX ADMIN — ONDANSETRON 4 MG: 4 TABLET, ORALLY DISINTEGRATING ORAL at 16:12

## 2020-01-05 NOTE — ED NOTES
Dr [facility]  01/05/20 [id number]

## 2020-01-05 NOTE — ED PROVIDER NOTES
History  Chief Complaint   Patient presents with    Vomiting     Patient reports nausea and vomiting since last night  Also reporting diarrhea  Melanie Rios is an 35y o  year old female with no significant PMHx, who presents to the ED today with nausea, vomiting, and diarrhea since this morning  She admits to some generalized abdominal soreness from throwing up  Her friend thinks she has a hangover because she had 4 glasses of wine yesterday in the middle of the day with two shots  It is not exacerbated or relieved by anything in particular  The pain is sore in quality, does not radiate, and currently rated mild in severity  The patient has no sick contacts  Today she had numbness and tingling in both of her hands, which is what made her come to the emergency department  The numbness/tingling has resolved  The patient denies fevers, headaches, lightheadedness or syncope, vision changes, hearing changes, chest pain, shortness of breath, changes with urination, back pain, numbness or tingling, rashes, pain anywhere else in body  The patient has recent travel history, new or changing medications, or immunocompromise  Patient denies use of drugs  History provided by:  Patient and medical records   used: No    Vomiting   Duration:  1 day  Timing:  Constant  Quality:  Undigested food and stomach contents  Progression:  Unchanged  Chronicity:  New  Recent urination:  Normal  Relieved by:  Nothing  Worsened by:  Nothing  Ineffective treatments:  None tried  Associated symptoms: abdominal pain, chills and diarrhea    Associated symptoms: no arthralgias, no cough, no fever, no headaches, no myalgias and no sore throat        Prior to Admission Medications   Prescriptions Last Dose Informant Patient Reported?  Taking?   escitalopram (LEXAPRO) 5 mg tablet Not Taking at Unknown time  No No   Sig: Take 1 tablet (5 mg total) by mouth daily   Patient not taking: Reported on 1/5/2020 Facility-Administered Medications: None       Past Medical History:   Diagnosis Date    Abnormal Pap smear of cervix     HPV (human papilloma virus) infection        History reviewed  No pertinent surgical history  Family History   Problem Relation Age of Onset    Diabetes type II Mother         Controlled type 2 diabetes mellitus     I have reviewed and agree with the history as documented  Social History     Tobacco Use    Smoking status: Never Smoker    Smokeless tobacco: Never Used   Substance Use Topics    Alcohol use: Yes     Comment: socailly    Drug use: No     Comment: No illicit drug use        Review of Systems   Constitutional: Positive for appetite change, chills and fatigue  Negative for activity change and fever  HENT: Negative for rhinorrhea and sore throat  Eyes: Negative for visual disturbance  Respiratory: Negative for cough and shortness of breath  Cardiovascular: Negative for chest pain and palpitations  Gastrointestinal: Positive for abdominal pain, diarrhea, nausea and vomiting  Genitourinary: Negative for decreased urine volume, difficulty urinating, dysuria and flank pain  Musculoskeletal: Negative for arthralgias, back pain, myalgias and neck pain  Skin: Negative for rash  Neurological: Positive for numbness  Negative for dizziness, light-headedness and headaches  Psychiatric/Behavioral: Negative for confusion  All other systems reviewed and are negative        Physical Exam  ED Triage Vitals [01/05/20 1550]   Temperature Pulse Respirations Blood Pressure SpO2   98 °F (36 7 °C) 76 18 105/80 98 %      Temp Source Heart Rate Source Patient Position - Orthostatic VS BP Location FiO2 (%)   Oral Monitor Sitting Right arm --      Pain Score       4             Orthostatic Vital Signs  Vitals:    01/05/20 1550   BP: 105/80   Pulse: 76   Patient Position - Orthostatic VS: Sitting       Physical Exam   Constitutional: She appears well-developed and well-nourished  No distress  Appears uncomfortable   HENT:   Head: Normocephalic and atraumatic  Mouth/Throat: Oropharynx is clear and moist    Eyes: Conjunctivae are normal  No scleral icterus  Neck: Neck supple  No JVD present  No tracheal deviation present  Cardiovascular: Normal rate and regular rhythm  Pulmonary/Chest: Effort normal and breath sounds normal  No respiratory distress  Abdominal: Soft  There is no tenderness  There is no guarding  Musculoskeletal: She exhibits no edema or deformity  Neurological: She is alert  Moves all extremities  Strength 5/5 in all extremities  Sensation intact to light touch  Skin: Skin is warm and dry  No rash noted  She is not diaphoretic  Psychiatric: She has a normal mood and affect  Her behavior is normal    Nursing note and vitals reviewed        ED Medications  Medications   ondansetron (ZOFRAN-ODT) dispersible tablet 4 mg (4 mg Oral Given 1/5/20 1612)       Diagnostic Studies  Results Reviewed     Procedure Component Value Units Date/Time    Basic metabolic panel [[id number]]  (Abnormal) Collected:  01/05/20 1625    Lab Status:  Final result Specimen:  Blood from Arm, Left Updated:  01/05/20 1653     Sodium 140 mmol/L      Potassium 3 1 mmol/L      Chloride 109 mmol/L      CO2 22 mmol/L      ANION GAP 9 mmol/L      BUN 10 mg/dL      Creatinine 0 58 mg/dL      Glucose 96 mg/dL      Calcium 8 8 mg/dL      eGFR 125 ml/min/1 73sq m     Narrative:       [place] guidelines for Chronic Kidney Disease (CKD):     Stage 1 with normal or high GFR (GFR > 90 mL/min/1 73 square meters)    Stage 2 Mild CKD (GFR = 60-89 mL/min/1 73 square meters)    Stage 3A Moderate CKD (GFR = 45-59 mL/min/1 73 square meters)    Stage 3B Moderate CKD (GFR = 30-44 mL/min/1 73 square meters)    Stage 4 Severe CKD (GFR = 15-29 mL/min/1 73 square meters)    Stage 5 End Stage CKD (GFR <15 mL/min/1 73 square meters)  Note: GFR calculation is accurate only with a steady state creatinine                 No orders to display         Procedures  Procedures      ED Course  ED Course as of Jan 05 1709   Sukhdev Sosa Jan 05, 2020   1613 Will do PO challenge prior to d/c      1708 Symptoms improved after zofran administration  Taking PO fluids without difficulty  MDM  Number of Diagnoses or Management Options  Diagnosis management comments: Vomitus not bilious, bloody, black/coffee grounds  No HA, vertigo, AMS, suspicion of ingestion, pregnancy  No suspicion for aspiration  No travel  Can tolerate PO  VS normal         Amount and/or Complexity of Data Reviewed  Review and summarize past medical records: yes  Discuss the patient with other providers: yes    Risk of Complications, Morbidity, and/or Mortality  Presenting problems: low  Diagnostic procedures: low  Management options: low    Patient Progress  Patient progress: stable        Disposition  Final diagnoses:   Nausea vomiting and diarrhea     Time reflects when diagnosis was documented in both MDM as applicable and the Disposition within this note     Time User Action Codes Description Comment    1/5/2020  4:23 PM Edwin Busch Add [R11 2,  R19 7] Nausea vomiting and diarrhea       ED Disposition     ED Disposition Condition Date/Time Comment    Discharge Stable Sun Jan 5, 2020  4:23 PM Alec De Leon discharge to home/self care  Return precautions given and questions answered  Follow-up Information     Follow up With Specialties Details Why Contact Info    Debby Dsouza MD Family Medicine Call in 1 day To recheck your symptoms [id number]  [phone]            Patient's Medications   Discharge Prescriptions    ONDANSETRON (ZOFRAN) 4 MG TABLET    Take 1 tablet (4 mg total) by mouth every 6 (six) hours       Start Date: 1/5/2020  End Date: --       Order Dose: 4 mg       Quantity: 12 tablet    Refills: 0     No discharge procedures on file  ED Provider  Attending physically available and evaluated Vish Hanna  HORACIO managed the patient along with the ED Attending      Electronically Signed by         Daniel Camacho DO  01/05/20 7157

## 2020-01-05 NOTE — DISCHARGE INSTRUCTIONS
You were seen today in the Emergency Department for nausea, vomiting, and diarrhea  Your workup included a check of your electrolytes and revealed nothing concerning at this time  Please follow up with your Primary Care Provider in the next 1-2 days to recheck your symptoms  Please return to the Emergency Department if you have fevers, chills, chest pain, shortness of breath, are unable to eat or drink, or have any other symptoms that concern you  Please look this over and let your nurse and/or me know if you have any further questions before you leave

## 2020-01-05 NOTE — ED ATTENDING ATTESTATION
1/5/2020  Marguerite Toledo DO, saw and evaluated the patient  I have discussed the patient with the resident/non-physician practitioner and agree with the resident's/non-physician practitioner's findings, Plan of Care, and MDM as documented in the resident's/non-physician practitioner's note, except where noted  All available labs and Radiology studies were reviewed  I was present for key portions of any procedure(s) performed by the resident/non-physician practitioner and I was immediately available to provide assistance  At this point I agree with the current assessment done in the Emergency Department  I have conducted an independent evaluation of this patient a history and physical is as follows:    36 yo female c/o n/v/d starting last night  Generalized in nature, no sig a/e factors  Symptoms rated 4/10  No other c/o at this time  abd sndnt no r/g bs+    Imp: n/v/d likely AGE plan: BMP, tx sx, reassess          ED Course         Critical Care Time  Procedures

## 2020-01-16 ENCOUNTER — LAB (OUTPATIENT)
Dept: LAB | Facility: CLINIC | Age: 30
End: 2020-01-16
Payer: COMMERCIAL

## 2020-01-16 ENCOUNTER — OFFICE VISIT (OUTPATIENT)
Dept: DERMATOLOGY | Facility: CLINIC | Age: 30
End: 2020-01-16
Payer: COMMERCIAL

## 2020-01-16 VITALS — TEMPERATURE: 97.5 F | HEIGHT: 60 IN | BODY MASS INDEX: 23.56 KG/M2 | WEIGHT: 120 LBS

## 2020-01-16 DIAGNOSIS — L64.9 ANDROGENETIC ALOPECIA: ICD-10-CM

## 2020-01-16 DIAGNOSIS — L65.0 TELOGEN EFFLUVIUM: ICD-10-CM

## 2020-01-16 DIAGNOSIS — L65.0 TELOGEN EFFLUVIUM: Primary | ICD-10-CM

## 2020-01-16 LAB — FERRITIN SERPL-MCNC: 97 NG/ML (ref 8–388)

## 2020-01-16 PROCEDURE — 36415 COLL VENOUS BLD VENIPUNCTURE: CPT

## 2020-01-16 PROCEDURE — 99203 OFFICE O/P NEW LOW 30 MIN: CPT | Performed by: STUDENT IN AN ORGANIZED HEALTH CARE EDUCATION/TRAINING PROGRAM

## 2020-01-16 PROCEDURE — 82728 ASSAY OF FERRITIN: CPT

## 2020-01-16 NOTE — PROGRESS NOTES
[address] [facility] Note     Patient Name: Cesar Rich  Encounter Date: 01/16/2020    Today's Chief Concerns:  [facility] Concern #1:  Hair loss      Past Medical History:  Have you ever had or currently have any of the following medical conditions or treatments? · HIV/AIDS: No  · Hepatitis B: No  · Hepatitis C:No  · Diabetes:No  · Tuberculosis: No  · Biologic Therapy/Chemotherapy: No  · Organ or Bone Marrow Transplantation: No  · Radiation Treatment: No  · Cancer (If Yes, which types)- No      Have you ever had any of the following skin conditions? · Melanoma? (If Yes, please provide more detail)- No  · Basal Cell Carcinoma: No  · Squamous Cell Carcinoma: No  · Sebaceous Cell Carcinoma: No  · Merkel Cell Carcinoma: No  · Angiosarcoma: No  · Blistering Sunburns: No  · Eczema: No  · Psoriasis: No    Social History:    What is your current Smoking Status? Never smoker    What is/was your primary occupation?      What are your hobbies/past-times? Hiking     Family history:  Do any of your "first degree relatives" (parent, brother, sister, or child) have any of the following conditions? · Melanoma? (If Yes, which relatives?) No  · Eczema: No  · Asthma: No  · Hay Fever/Seasonal Allergies: No  · Psoriasis: No  · Arthritis: YES  · Thyroid Problems: No  · Lupus/Connective Tissue Disease: No  · Diabetes: YES  · Stroke: No  · Blood Clots: No  · IBD/Crohn's/Ulcerative Colitis: No  · Vitiligo: No  · Scarring/Keloids: No  · Severe Acne: No  · Pancreatic Cancer: No  · Other known Skin Condition? If Yes, what condition and which relatives?   No    Current Medications:    Current Outpatient Medications:     escitalopram (LEXAPRO) 5 mg tablet, Take 1 tablet (5 mg total) by mouth daily (Patient not taking: Reported on 1/5/2020), Disp: 30 tablet, Rfl: 0    ondansetron (ZOFRAN) 4 mg tablet, Take 1 tablet (4 mg total) by mouth every 6 (six) hours (Patient not taking: Reported on 1/16/2020), Disp: 12 tablet, Rfl: 0    Specific Alerts:    Have you been seen by a [facility] Dermatologist in the last 3 years? No    Are you pregnant or planning to become pregnant? No    Are you currently or planning to be nursing or breast feeding? No    No Known Allergies    May we call your Preferred Phone number to discuss your specific medical information? YES    May we leave a detailed message that includes your specific medical information? YES    Have you traveled outside of the [facility] in the past 3 months? No    Do you currently have a pacemaker or defibrillator? No    Do you have any artificial heart valves, joints, plates, screws, rods, stents, pins, etc? No   - If Yes, were any placed within the last 2 years? Do you require any medications prior to a surgical procedure? No   - If Yes, for which procedure? n/a   - If Yes, what medications to you require? n/a    Are you taking any medications that cause you to bleed more easily ("blood thinners") No    Have you ever experienced a rapid heartbeat with epinephrine? No    Have you ever been treated with "gold" (gold sodium thiomalate) therapy? No    [facility] can help with wrinkles, "laugh lines," facial volume loss, "double chin," "love handles," age spots, and more  Are you interested in learning today about some of the skin enhancement procedures that we offer? (If Yes, please provide more detail) No    Review of Systems:  Have you recently had or currently have any of the following?     · Fever or chills: No  · Night Sweats: No  · Headaches: No  · Weight Gain: No  · Weight Loss: No  · Blurry Vision: No  · Nausea: No  · Vomiting: No  · Diarrhea: No  · Blood in Stool: No  · Abdominal Pain: No  · Itchy Skin: YES  · Painful Joints: No  · Swollen Joints: No  · Muscle Pain: No  · Irregular Mole: No  · Sun Burn: No  · Dry Skin: No  · Skin Color Changes: No  · Scar or Keloid: No  · Cold Sores/Fever Blisters: No  · Bacterial Infections/MRSA: No  · Anxiety: YES  · Depression: No  · Suicidal or Homicidal Thoughts: No      PHYSICAL EXAM:      Was a chaperone (Derm Clinical Assistant) present for the entirety of the Physical Exam? YES    Did the Dermatology Team specifically ask and  the patient on the importance of a Full Skin Exam to be sure that nothing is missed clinically? YES    Did the patient request or accept a Full Skin Exam?  No    Did the patient specifically refuse to have the areas "under-the-bra" examined by the Dermatologist? No    Did the patient specifically refuse to have the areas "under-the-underwear" examined by the Dermatologist? No      CONSTITUTIONAL:   Vitals:    01/16/20 0853   Temp: 97 5 °F (36 4 °C)   TempSrc: Tympanic   Weight: 54 4 kg (120 lb)   Height: 5' (1 524 m)       PSYCH: Normal mood and affect  EYES: Normal conjunctiva  ENT: Normal lips and oral mucosa  CARDIOVASCULAR: No edema  RESPIRATORY: Normal respirations  HEME/LYMPH/IMMUNO:  No regional lymphadenopathy except as noted below in ASSESSMENT AND PLAN BY DIAGNOSIS    FULL ORGAN SYSTEM SKIN EXAM (SKIN)  Hair, Scalp, Ears, Face Normal except as noted below in Assessment   Neck Normal except as noted below in Assessment   Right Arm/Hand/Fingers    Left Arm/Hand/Fingers    Chest/Breasts/Axillae    Abdomen, Umbilicus    Back/Spine    Groin/Genitalia/Buttocks    Right Leg, Foot, Toes    Left Leg, Foot, Toes         ASSESSMENT AND PLAN BY DIAGNOSIS:    History of Present Condition:     Duration:  How long has this been an issue for you?    o  3 years    Location Affected:  Where on the body is this affecting you?    o  Scalp   Quality:  Is there any bleeding, pain, itch, burning/irritation, or redness associated with the skin lesion? o  Tenderness   Severity:  Describe any bleeding, pain, itch, burning/irritation, or redness on a scale of 1 to 10 (with 10 being the worst)    o  3   Timing:  Does this condition seem to be there pretty constantly or do you notice it more at specific times throughout the day?    o  Constantly   Context:  Have you ever noticed that this condition seems to be associated with specific activities you do?    o  Denies   Modifying Factors:    o Anything that seems to make the condition worse?    -  Denieis  o What have you tried to do to make the condition better? 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    Physical Exam:   Anatomic Location Affected:  Scalp, Diffuse throughout   Morphological Description:  Mild hair loss with increase miniaturization 20%, Pull test equivocal  Picture taken   Pertinent Positives:   Pertinent Negatives: No lymphadenopathy    Additional History of Present Condition:  Located on the scalp  Present for about 3 years  Pt reports slight tenderness  Currently on home therapies  Denies any improvement  Pt have labs on file, Pt stated have a family history of hair  thinning  Assessment and Plan:  Based on a thorough discussion of this condition and the management approach to it (including a comprehensive discussion of the known risks, side effects and potential benefits of treatment), the patient (family) agrees to implement the following specific plan:   Start Rogaine for men 5 % foam  once  a day daily before bedtime    If no improvement, consider finasteride (no personal or family history of breast, ovarian, uterine carcinoma)   Picture taken   Start OTC vitamin D   Labs for ferritin    Pt did not report tenderness during physician exam, but MA reports patient has mild tenderness on scalp occasionally; will ask patient at next visit to clarify   Follow Up 3 months     What is telogen effluvium?   Telogen effluvium is the name for temporary hair loss due to the shedding of resting or telogen hair after some shock to the system  New hair continues to grow  Telogen hair has a bulb or club-shaped tip  It should be distinguished from anagen effluvium, in which the hair shedding is due to interruption of active or anagen hair growth by drugs, toxins or inflammation (eg, alopecia areata)  Anagen hair has a pointed or tapered tip  What is the cause of telogen effluvium? In a normal healthy person's scalp about 78% of the hair follicles are actively growing hair (anagen hair) and 15% are resting hair (telogen hair)  A hair follicle usually grows anagen hair for 4 years or so, then rests for about 4 months  The resting or telogen hair has a club or bulb at the tip  A new anagen hair begins to grow under the resting telogen hair and pushes it out  Thus, it is normal to lose up to about 100 hairs a day on one's comb, brush, in the basin or on the pillow, as a result of the normal scalp hair cycle  If there is some shock to the system, as many as 70% of the anagen hairs can be precipitated into telogen, thus reversing the usual ratio  Typical "shock" triggers include:   Illness, especially if there is fever    Surgical operation    Accident    Childbirth    Nervous shock    Weight loss or unusual diet    Certain medications    Discontinuing the contraceptive pill    Overseas travel resulting in jetlag    Excessive sun exposure    At first, the resting scalp hairs, now in the form of club hairs, remain firmly attached to the hair follicles  It is only about 2-4 months after the shock that the new hairs coming up through the scalp push out the 'dead' club hairs and increased hair fall are noticed  Thus, paradoxically, with this type of hair loss, hair fall is a sign of hair regrowth  As the new hair first comes up through the scalp and pushes out the dead hair a fine fringe of new hair is often evident along the forehead hairline   At first, the fall of club hairs is profuse and a general thinning of the scalp hair may become evident but after several months a peak is reached and hair fall begins to lessen, gradually tapering back to normal over 6-9 months  As the hair fall tapers off the scalp thickens back up to normal, but recovery may be incomplete in some cases  Because nail and hair growth are under the same influences, an arrest in hair growth is often mirrored in the nails by a groove across them coinciding with the time of the shock to the system  This is called Beau's line  The time of the shock can be estimated from the fact that a fingernail takes 5 months to grow from the posterior nail fold to the free edge  So if the groove in the nail is half way down the nail then the shock must have been 2 1/2 months ago  Chronic telogen effluvium  In some patients, hair shedding continues to be intermittently or continuously greater than normal for long periods of time, sometimes for years  The hair cycle appears to be reset so that the anagen period is shortened  Chronic telogen effluvium often presents in women that actually continue to have quite thick and moderately long hair - this is because they notice the shed hair more than those with finer or shorter hair  Telogen effluvium does not cause complete baldness, although it may unmask a genetic tendency to genetic balding i e  female pattern hair loss, or in men, male pattern hair loss  What is the treatment for telogen effluvium? Telogen effluvium is self-correcting  It is really not influenced by any treatment that can be given  However, gentle handling of the hair, avoiding over-vigorous combing, brushing and any type of scalp massage are important  You should also ensure a nutritious diet, with plenty of protein, fruit and vegetables  The doctor may check your thyroid function, and levels of iron, vitamin T92 and folic acid, as any deficiency in these can slow hair growth  The psychological effects of hair loss should not be ignored        Scribe Attestation    I,:   Ashu Dahl MA am acting as a scribe while in the presence of the attending physician :        I,:   Montey Cushing, MD personally performed the services described in this documentation    as scribed in my presence :

## 2020-01-16 NOTE — PATIENT INSTRUCTIONS
Assessment and Plan:  Based on a thorough discussion of this condition and the management approach to it (including a comprehensive discussion of the known risks, side effects and potential benefits of treatment), the patient (family) agrees to implement the following specific plan:   Start Rogaine for men 5 % foam  once  a day daily before bedtime    Start OTC vitamin D   Labs for ferritin    Follow Up 3 months     What is telogen effluvium? Telogen effluvium is the name for temporary hair loss due to the shedding of resting or telogen hair after some shock to the system  New hair continues to grow  Telogen hair has a bulb or club-shaped tip  It should be distinguished from anagen effluvium, in which the hair shedding is due to interruption of active or anagen hair growth by drugs, toxins or inflammation (eg, alopecia areata)  Anagen hair has a pointed or tapered tip  What is the cause of telogen effluvium? In a normal healthy person's scalp about 50% of the hair follicles are actively growing hair (anagen hair) and 15% are resting hair (telogen hair)  A hair follicle usually grows anagen hair for 4 years or so, then rests for about 4 months  The resting or telogen hair has a club or bulb at the tip  A new anagen hair begins to grow under the resting telogen hair and pushes it out  Thus, it is normal to lose up to about 100 hairs a day on one's comb, brush, in the basin or on the pillow, as a result of the normal scalp hair cycle  If there is some shock to the system, as many as 70% of the anagen hairs can be precipitated into telogen, thus reversing the usual ratio       Typical "shock" triggers include:   Illness, especially if there is fever    Surgical operation    Accident    Childbirth    Nervous shock    Weight loss or unusual diet    Certain medications    Discontinuing the contraceptive pill    O[place] travel resulting in jetlag    Excessive sun exposure    At first, the resting scalp hairs, now in the form of club hairs, remain firmly attached to the hair follicles  It is only about 2-4 months after the shock that the new hairs coming up through the scalp push out the 'dead' club hairs and increased hair fall are noticed  Thus, paradoxically, with this type of hair loss, hair fall is a sign of hair regrowth  As the new hair first comes up through the scalp and pushes out the dead hair a fine fringe of new hair is often evident along the forehead hairline  At first, the fall of club hairs is profuse and a general thinning of the scalp hair may become evident but after several months a peak is reached and hair fall begins to lessen, gradually tapering back to normal over 6-9 months  As the hair fall tapers off the scalp thickens back up to normal, but recovery may be incomplete in some cases  Because nail and hair growth are under the same influences, an arrest in hair growth is often mirrored in the nails by a groove across them coinciding with the time of the shock to the system  This is called Beau's line  The time of the shock can be estimated from the fact that a fingernail takes 5 months to grow from the posterior nail fold to the free edge  So if the groove in the nail is half way down the nail then the shock must have been 2 1/2 months ago  Chronic telogen effluvium  In some patients, hair shedding continues to be intermittently or continuously greater than normal for long periods of time, sometimes for years  The hair cycle appears to be reset so that the anagen period is shortened  Chronic telogen effluvium often presents in women that actually continue to have quite thick and moderately long hair - this is because they notice the shed hair more than those with finer or shorter hair   Telogen effluvium does not cause complete baldness, although it may unmask a genetic tendency to genetic balding i e  female pattern hair loss, or in men, male pattern hair loss     What is the treatment for telogen effluvium? Telogen effluvium is self-correcting  It is really not influenced by any treatment that can be given  However, gentle handling of the hair, avoiding over-vigorous combing, brushing and any type of scalp massage are important  You should also ensure a nutritious diet, with plenty of protein, fruit and vegetables  The doctor may check your thyroid function, and levels of iron, vitamin O30 and folic acid, as any deficiency in these can slow hair growth  The psychological effects of hair loss should not be ignored

## 2020-01-20 NOTE — RESULT ENCOUNTER NOTE
Called patient, no answer, left VM and call back number  [facility]: If possible, call patient to inform her that iron level (ferritin) is normal and no supplements needed  Thank you

## 2020-01-21 ENCOUNTER — OFFICE VISIT (OUTPATIENT)
Dept: FAMILY MEDICINE CLINIC | Facility: CLINIC | Age: 30
End: 2020-01-21
Payer: COMMERCIAL

## 2020-01-21 VITALS
TEMPERATURE: 98.1 F | HEIGHT: 60 IN | RESPIRATION RATE: 18 BRPM | OXYGEN SATURATION: 98 % | BODY MASS INDEX: 23.87 KG/M2 | SYSTOLIC BLOOD PRESSURE: 110 MMHG | HEART RATE: 70 BPM | WEIGHT: 121.6 LBS | DIASTOLIC BLOOD PRESSURE: 76 MMHG

## 2020-01-21 DIAGNOSIS — E55.9 VITAMIN D DEFICIENCY: Primary | ICD-10-CM

## 2020-01-21 DIAGNOSIS — F41.9 ANXIETY: ICD-10-CM

## 2020-01-21 PROCEDURE — 99213 OFFICE O/P EST LOW 20 MIN: CPT | Performed by: INTERNAL MEDICINE

## 2020-01-21 RX ORDER — MELATONIN
4000 DAILY
COMMUNITY
End: 2020-02-20 | Stop reason: ALTCHOICE

## 2020-01-21 RX ORDER — ESCITALOPRAM OXALATE 5 MG/1
5 TABLET ORAL DAILY
Qty: 30 TABLET | Refills: 0 | Status: SHIPPED | OUTPATIENT
Start: 2020-01-21 | End: 2020-02-20 | Stop reason: SDUPTHER

## 2020-01-21 NOTE — PROGRESS NOTES
Assessment/Plan:     Diagnoses and all orders for this visit:    Vitamin D deficiency  -Will star 4000 IU daily  Anxiety  -     escitalopram (LEXAPRO) 5 mg tablet; Take 1 tablet (5 mg total) by mouth daily  Discussed this in general  She will re-start the Lexapro and follow up in about 1 month  Other orders  -     cholecalciferol (VITAMIN D3) 1,000 units tablet; Take 4,000 Units by mouth daily        Subjective:      Patient ID: Nadeen Garcia is a 35 y o  female  Kelley Kent is here today to discuss anxiety  Reports that she has noticed this in the past but is unsure if she needs medication or if it helped her when she took it for a brief period  She will report feeling a certain way and at times sleep can be affected  She has not talked to a therapist in the past  She was started on Lexapro to see if this would help but took it for a short period and did not follow up  The following portions of the patient's history were reviewed and updated as appropriate: allergies, current medications, past family history, past medical history, past social history, past surgical history and problem list     Review of Systems   Constitutional: Negative for appetite change, chills, fever and unexpected weight change  Respiratory: Negative for cough, chest tightness and shortness of breath  Cardiovascular: Negative for chest pain  Gastrointestinal: Negative for abdominal pain, diarrhea, nausea and vomiting  Musculoskeletal: Negative for arthralgias and myalgias  Neurological: Negative for dizziness, numbness and headaches  Psychiatric/Behavioral: Negative for dysphoric mood and sleep disturbance  The patient is nervous/anxious  Objective:      /76   Pulse 70   Temp 98 1 °F (36 7 °C)   Resp 18   Ht 5' (1 524 m)   Wt 55 2 kg (121 lb 9 6 oz)   LMP 01/01/2020   SpO2 98%   BMI 23 75 kg/m²          Physical Exam   Constitutional: She is oriented to person, place, and time   She appears well-developed and well-nourished  No distress  HENT:   Head: Normocephalic and atraumatic  Eyes: Conjunctivae and EOM are normal  Right eye exhibits no discharge  Left eye exhibits no discharge  No scleral icterus  Neck: Normal range of motion  Cardiovascular: Normal rate, regular rhythm and normal heart sounds  No murmur heard  Pulmonary/Chest: Effort normal and breath sounds normal  No respiratory distress  She has no wheezes  Abdominal: Soft  Bowel sounds are normal  There is no tenderness  Musculoskeletal: Normal range of motion  She exhibits no edema  Lymphadenopathy:     She has no cervical adenopathy  Neurological: She is alert and oriented to person, place, and time  Skin: Skin is warm and dry  She is not diaphoretic  No erythema  Psychiatric: She has a normal mood and affect  Her speech is normal and behavior is normal  Judgment and thought content normal    Vitals reviewed

## 2020-01-22 DIAGNOSIS — L63.9 ALOPECIA AREATA: ICD-10-CM

## 2020-01-22 DIAGNOSIS — L64.9 ANDROGENETIC ALOPECIA: Primary | ICD-10-CM

## 2020-01-22 NOTE — TELEPHONE ENCOUNTER
Telephone call from patient  Patient states does not wish to start Rogaine Men foam do to side effects  Patient stated she wishes to start Finasteride tablets   Please sign RX

## 2020-01-22 NOTE — RESULT ENCOUNTER NOTE
Telephone call to patient  Left VM in regards lab results   Pt is to return my call as earliest as her conveniens

## 2020-01-23 DIAGNOSIS — L64.9 ANDROGENETIC ALOPECIA: ICD-10-CM

## 2020-01-23 DIAGNOSIS — L63.9 ALOPECIA AREATA: Primary | ICD-10-CM

## 2020-01-23 RX ORDER — FINASTERIDE 1 MG/1
TABLET, FILM COATED ORAL
Qty: 30 TABLET | Refills: 1 | Status: CANCELLED | OUTPATIENT
Start: 2020-01-23

## 2020-01-23 RX ORDER — FINASTERIDE 1 MG/1
1 TABLET, FILM COATED ORAL DAILY
Qty: 30 TABLET | Refills: 3 | Status: CANCELLED | OUTPATIENT
Start: 2020-01-23

## 2020-01-23 RX ORDER — FINASTERIDE 5 MG/1
2.5 TABLET, FILM COATED ORAL DAILY
Qty: 15 TABLET | Refills: 2 | Status: SHIPPED | OUTPATIENT
Start: 2020-01-23 | End: 2020-04-18 | Stop reason: SDUPTHER

## 2020-01-23 NOTE — TELEPHONE ENCOUNTER
Patient says does not want rogaine  Am willing to prescribe finasteride 2 5 mg daily  Side effects include breast tenderness, menstrual irregularities, decreased libido (others listed on package insert)  HOWEVER, BEFORE PRESCRIBING MEDICATION, MUST  PATIENT CANNOT GET PREGNANT (can cause birth defects)  Clinical derm team: Please inform patient that cannot get pregnant while on medication  Once reach her, we can prescribe medication  Was not able to reach patient today

## 2020-01-23 NOTE — TELEPHONE ENCOUNTER
Spoke to patient  Counseled against pregnancy while on medication  Instructed patient do not know likelihood of becoming pregnant if desired after stopping medication  Counseled about side effects (decreased libido, breast tenderness, etc)  After discussion, patient elects to take medication for 1 month trial period  Sent medication to pharmacy

## 2020-02-20 ENCOUNTER — OFFICE VISIT (OUTPATIENT)
Dept: FAMILY MEDICINE CLINIC | Facility: CLINIC | Age: 30
End: 2020-02-20
Payer: COMMERCIAL

## 2020-02-20 VITALS
BODY MASS INDEX: 23.71 KG/M2 | HEART RATE: 74 BPM | TEMPERATURE: 98 F | RESPIRATION RATE: 18 BRPM | HEIGHT: 60 IN | SYSTOLIC BLOOD PRESSURE: 110 MMHG | WEIGHT: 120.8 LBS | DIASTOLIC BLOOD PRESSURE: 70 MMHG | OXYGEN SATURATION: 98 %

## 2020-02-20 DIAGNOSIS — E55.9 VITAMIN D DEFICIENCY: ICD-10-CM

## 2020-02-20 DIAGNOSIS — L65.9 THINNING HAIR: ICD-10-CM

## 2020-02-20 DIAGNOSIS — Z00.00 ENCOUNTER FOR PHYSICAL EXAMINATION: Primary | ICD-10-CM

## 2020-02-20 DIAGNOSIS — F41.9 ANXIETY: ICD-10-CM

## 2020-02-20 PROCEDURE — 99395 PREV VISIT EST AGE 18-39: CPT | Performed by: INTERNAL MEDICINE

## 2020-02-20 RX ORDER — ESCITALOPRAM OXALATE 5 MG/1
5 TABLET ORAL DAILY
Qty: 90 TABLET | Refills: 3 | Status: SHIPPED | OUTPATIENT
Start: 2020-02-20 | End: 2020-03-09 | Stop reason: SDUPTHER

## 2020-02-20 RX ORDER — MELATONIN
4000 DAILY
Qty: 120 TABLET | Refills: 2 | Status: SHIPPED | OUTPATIENT
Start: 2020-02-20 | End: 2020-03-21

## 2020-02-20 NOTE — ASSESSMENT & PLAN NOTE
Feels well overall on Lexapro  Discussed watching how she feels and there is room to move up if need be

## 2020-02-20 NOTE — PROGRESS NOTES
Assessment/Plan:     1  Encounter for physical examination  Assessment & Plan:  UTD with eye and dental exams  Follows regularly with Gynecology  2  Anxiety  Assessment & Plan:  Feels well overall on Lexapro  Discussed watching how she feels and there is room to move up if need be  Orders:  -     escitalopram (LEXAPRO) 5 mg tablet; Take 1 tablet (5 mg total) by mouth daily    3  Vitamin D deficiency  Assessment & Plan:  Discussed starting Vit D 4000 IU daily  Orders:  -     cholecalciferol (VITAMIN D3) 1,000 units tablet; Take 4 tablets (4,000 Units total) by mouth daily    4  Thinning hair  Assessment & Plan:  She follows with Dermatology now and was prescribed proscar  Subjective:      Patient ID: Jez Braun is a 35 y o  female  Sapphire Goff is here today for a routine physical  She reports feeling well overall and is largely without complaints  She is following with Dermatology now concerning her thinning hair  The following portions of the patient's history were reviewed and updated as appropriate: She  has a past medical history of Abnormal Pap smear of cervix and HPV (human papilloma virus) infection  She   Patient Active Problem List    Diagnosis Date Noted    Encounter for physical examination 02/20/2020    Vitamin D deficiency 01/21/2020    Anxiety 10/07/2019    Thinning hair 05/11/2017     She  has no past surgical history on file  Her family history includes Diabetes type II in her mother  She  reports that she has never smoked  She has never used smokeless tobacco  She reports that she drinks alcohol  She reports that she does not use drugs    Current Outpatient Medications   Medication Sig Dispense Refill    escitalopram (LEXAPRO) 5 mg tablet Take 1 tablet (5 mg total) by mouth daily 90 tablet 3    finasteride (PROSCAR) 5 mg tablet Take 0 5 tablets (2 5 mg total) by mouth daily 15 tablet 2    cholecalciferol (VITAMIN D3) 1,000 units tablet Take 4 tablets (4,000 Units total) by mouth daily 120 tablet 2     No current facility-administered medications for this visit  She has No Known Allergies       Review of Systems   Constitutional: Negative for chills, fever and unexpected weight change  HENT: Negative for sinus pressure, sinus pain and sore throat  Eyes: Negative for visual disturbance  Respiratory: Negative for cough, chest tightness, shortness of breath and wheezing  Cardiovascular: Negative for chest pain, palpitations and leg swelling  Gastrointestinal: Negative for abdominal pain, blood in stool, constipation, diarrhea, nausea and vomiting  Genitourinary: Negative for difficulty urinating and dysuria  Musculoskeletal: Negative for arthralgias, back pain and myalgias  Skin: Negative  Neurological: Negative for dizziness, syncope, numbness and headaches  Psychiatric/Behavioral: Negative for dysphoric mood and sleep disturbance  The patient is not nervous/anxious  Objective:      /70   Pulse 74   Temp 98 °F (36 7 °C)   Resp 18   Ht 5' (1 524 m)   Wt 54 8 kg (120 lb 12 8 oz)   SpO2 98%   BMI 23 59 kg/m²          Physical Exam   Constitutional: She is oriented to person, place, and time  She appears well-developed and well-nourished  No distress  HENT:   Head: Normocephalic and atraumatic  Right Ear: External ear normal    Left Ear: External ear normal    Mouth/Throat: Oropharynx is clear and moist    Eyes: Pupils are equal, round, and reactive to light  Conjunctivae and EOM are normal  Right eye exhibits no discharge  Left eye exhibits no discharge  Neck: Normal range of motion  Neck supple  No tracheal deviation present  No thyromegaly present  Cardiovascular: Normal rate, regular rhythm and normal heart sounds  Pulmonary/Chest: Effort normal and breath sounds normal  No respiratory distress  She has no wheezes  Abdominal: Soft  Bowel sounds are normal  There is no tenderness     Musculoskeletal: Normal range of motion  She exhibits no edema  Lymphadenopathy:     She has no cervical adenopathy  Neurological: She is alert and oriented to person, place, and time  No cranial nerve deficit  Skin: Skin is warm and dry  She is not diaphoretic  Psychiatric: She has a normal mood and affect  Her speech is normal and behavior is normal  Judgment and thought content normal    Vitals reviewed

## 2020-03-09 DIAGNOSIS — F41.9 ANXIETY: ICD-10-CM

## 2020-03-09 RX ORDER — ESCITALOPRAM OXALATE 10 MG/1
10 TABLET ORAL DAILY
Qty: 30 TABLET | Refills: 3 | Status: SHIPPED | OUTPATIENT
Start: 2020-03-09 | End: 2020-09-22 | Stop reason: SDUPTHER

## 2020-04-18 DIAGNOSIS — L64.9 ANDROGENETIC ALOPECIA: ICD-10-CM

## 2020-04-23 RX ORDER — FINASTERIDE 5 MG/1
2.5 TABLET, FILM COATED ORAL DAILY
Qty: 15 TABLET | Refills: 0 | Status: SHIPPED | OUTPATIENT
Start: 2020-04-23 | End: 2020-04-28 | Stop reason: SDUPTHER

## 2020-04-28 ENCOUNTER — TELEMEDICINE (OUTPATIENT)
Dept: DERMATOLOGY | Facility: CLINIC | Age: 30
End: 2020-04-28
Payer: COMMERCIAL

## 2020-04-28 DIAGNOSIS — L64.9 ANDROGENIC ALOPECIA: Primary | ICD-10-CM

## 2020-04-28 PROCEDURE — 99213 OFFICE O/P EST LOW 20 MIN: CPT | Performed by: STUDENT IN AN ORGANIZED HEALTH CARE EDUCATION/TRAINING PROGRAM

## 2020-04-28 RX ORDER — FINASTERIDE 5 MG/1
TABLET, FILM COATED ORAL
Qty: 30 TABLET | Refills: 2 | Status: SHIPPED | OUTPATIENT
Start: 2020-04-28 | End: 2020-07-07 | Stop reason: SDUPTHER

## 2020-07-07 ENCOUNTER — OFFICE VISIT (OUTPATIENT)
Dept: DERMATOLOGY | Facility: CLINIC | Age: 30
End: 2020-07-07
Payer: COMMERCIAL

## 2020-07-07 VITALS — TEMPERATURE: 98.1 F | HEIGHT: 60 IN | BODY MASS INDEX: 25.13 KG/M2 | WEIGHT: 128 LBS

## 2020-07-07 DIAGNOSIS — L64.9 ANDROGENIC ALOPECIA: Primary | ICD-10-CM

## 2020-07-07 PROCEDURE — 99213 OFFICE O/P EST LOW 20 MIN: CPT | Performed by: STUDENT IN AN ORGANIZED HEALTH CARE EDUCATION/TRAINING PROGRAM

## 2020-07-07 RX ORDER — MINOXIDIL 2 %
SOLUTION, NON-ORAL TOPICAL
COMMUNITY

## 2020-07-07 RX ORDER — FINASTERIDE 5 MG/1
TABLET, FILM COATED ORAL
Qty: 30 TABLET | Refills: 2 | Status: SHIPPED | OUTPATIENT
Start: 2020-07-07 | End: 2020-11-18 | Stop reason: SDUPTHER

## 2020-07-07 RX ORDER — KETOCONAZOLE 20 MG/ML
SHAMPOO TOPICAL
Qty: 120 ML | Refills: 3 | Status: SHIPPED | OUTPATIENT
Start: 2020-07-07

## 2020-07-07 NOTE — PATIENT INSTRUCTIONS
Assessment and Plan:  Based on a thorough discussion of this condition and the management approach to it (including a comprehensive discussion of the known risks, side effects and potential benefits of treatment), the patient (family) agrees to implement the following specific plan:     · Continue finasteride 2 5 mg once daily  (no personal or family history of breast, ovarian, uterine carcinoma) (on birth control)  · Continue Rogaine 0 5% Foam once a day daily before bedtime (if sticky, can apply on nights before wash her hair in morning -she only washes a few times a week)  · Continue  OTC Vitamin D (1000 IU daily), start Nutrafol (over the counter)   · Start Ketoconazole Shampoo Daily for 2 weeks straight and then on "Mondays, Wednesdays and Fridays":  Lather into scalp and skin on face, neck, chest, and back; leave on for 5 minutes and then rinse off completely    · Follow Up 3-4 months

## 2020-09-22 ENCOUNTER — OFFICE VISIT (OUTPATIENT)
Dept: FAMILY MEDICINE CLINIC | Facility: CLINIC | Age: 30
End: 2020-09-22
Payer: COMMERCIAL

## 2020-09-22 VITALS
SYSTOLIC BLOOD PRESSURE: 114 MMHG | OXYGEN SATURATION: 98 % | BODY MASS INDEX: 25.21 KG/M2 | HEART RATE: 70 BPM | WEIGHT: 128.4 LBS | RESPIRATION RATE: 18 BRPM | DIASTOLIC BLOOD PRESSURE: 72 MMHG | HEIGHT: 60 IN | TEMPERATURE: 96.8 F

## 2020-09-22 DIAGNOSIS — Z11.3 SCREENING EXAMINATION FOR STD (SEXUALLY TRANSMITTED DISEASE): ICD-10-CM

## 2020-09-22 DIAGNOSIS — F41.9 ANXIETY: Primary | ICD-10-CM

## 2020-09-22 PROCEDURE — 99213 OFFICE O/P EST LOW 20 MIN: CPT | Performed by: INTERNAL MEDICINE

## 2020-09-22 RX ORDER — ESCITALOPRAM OXALATE 10 MG/1
10 TABLET ORAL DAILY
Qty: 30 TABLET | Refills: 3 | Status: SHIPPED | OUTPATIENT
Start: 2020-09-22

## 2020-09-22 NOTE — PROGRESS NOTES
Assessment/Plan:     1  Anxiety  -     escitalopram (LEXAPRO) 10 mg tablet; Take 1 tablet (10 mg total) by mouth daily    2  Screening examination for STD (sexually transmitted disease)  -     HIV 1/2 Antigen/Antibody (4th Generation) w Reflex [facility]; Future  -     Chlamydia/GC amplified DNA by PCR; Future  -     RPR; Future      Eusebia Blue was seen and examined in the office  PE is largely normal  She will remain on Lexapro  She would also like to complete STD testing and was ordered  Subjective:      Patient ID: Alyson Mtichell is a 35 y o  female  Eusebia Blue is here today for a follow up  She reports feeling well overall  She reports feeling that her anxiety/mood is well controlled on Lexapro  She has not noted any side effects and has helped overall  Other chart was reviewed  She is otherwise without complaints  The following portions of the patient's history were reviewed and updated as appropriate: allergies, past family history, past medical history, past social history, past surgical history and problem list     Current Outpatient Medications:     escitalopram (LEXAPRO) 10 mg tablet, Take 1 tablet (10 mg total) by mouth daily, Disp: 30 tablet, Rfl: 3    finasteride (PROSCAR) 5 mg tablet, Take 2 5 mg once daily, Disp: 30 tablet, Rfl: 2    ketoconazole (NIZORAL) 2 % shampoo, Apply 1-2x/week on scalp before other shampoos and conditioners, Disp: 120 mL, Rfl: 3    Minoxidil (Rogaine Womens) 5 % FOAM, Apply topically , Disp: , Rfl:     cholecalciferol (VITAMIN D3) 1,000 units tablet, Take 4 tablets (4,000 Units total) by mouth daily, Disp: 120 tablet, Rfl: 2    Review of Systems   Constitutional: Negative for chills and fever  Respiratory: Negative for cough and chest tightness  Cardiovascular: Negative for chest pain  Gastrointestinal: Negative for abdominal pain, diarrhea, nausea and vomiting  Musculoskeletal: Negative for arthralgias     Neurological: Negative for dizziness, numbness and headaches  Psychiatric/Behavioral: Negative for dysphoric mood and sleep disturbance  The patient is not nervous/anxious  Objective:      /72   Pulse 70   Temp (!) 46 8 °F (8 2 °C)   Resp 18   Ht 5' (1 524 m)   Wt 58 2 kg (128 lb 6 4 oz)   SpO2 98%   BMI 25 08 kg/m²          Physical Exam  Vitals signs reviewed  Constitutional:       General: She is not in acute distress  Appearance: She is well-developed  She is not diaphoretic  HENT:      Head: Normocephalic and atraumatic  Eyes:      General: No scleral icterus  Right eye: No discharge  Left eye: No discharge  Conjunctiva/sclera: Conjunctivae normal    Neck:      Musculoskeletal: Normal range of motion  Cardiovascular:      Rate and Rhythm: Normal rate and regular rhythm  Heart sounds: Normal heart sounds  No murmur  Pulmonary:      Effort: Pulmonary effort is normal  No respiratory distress  Breath sounds: Normal breath sounds  No wheezing  Musculoskeletal: Normal range of motion  Lymphadenopathy:      Cervical: No cervical adenopathy  Skin:     General: Skin is warm and dry  Findings: No erythema  Neurological:      Mental Status: She is alert and oriented to person, place, and time  Psychiatric:         Speech: Speech normal          Behavior: Behavior normal          Thought Content:  Thought content normal          Judgment: Judgment normal

## 2020-09-24 ENCOUNTER — APPOINTMENT (OUTPATIENT)
Dept: LAB | Facility: CLINIC | Age: 30
End: 2020-09-24
Payer: COMMERCIAL

## 2020-09-24 DIAGNOSIS — Z11.3 SCREENING EXAMINATION FOR STD (SEXUALLY TRANSMITTED DISEASE): ICD-10-CM

## 2020-09-24 PROCEDURE — 86592 SYPHILIS TEST NON-TREP QUAL: CPT

## 2020-09-24 PROCEDURE — 87591 N.GONORRHOEAE DNA AMP PROB: CPT

## 2020-09-24 PROCEDURE — 87389 HIV-1 AG W/HIV-1&-2 AB AG IA: CPT

## 2020-09-24 PROCEDURE — 87491 CHLMYD TRACH DNA AMP PROBE: CPT

## 2020-09-24 PROCEDURE — 36415 COLL VENOUS BLD VENIPUNCTURE: CPT

## 2020-09-25 LAB
HIV 1+2 AB+HIV1 P24 AG SERPL QL IA: NORMAL
RPR SER QL: NORMAL

## 2020-10-01 LAB
C TRACH DNA SPEC QL NAA+PROBE: NEGATIVE
N GONORRHOEA DNA SPEC QL NAA+PROBE: NEGATIVE

## 2020-11-09 ENCOUNTER — CLINICAL SUPPORT (OUTPATIENT)
Dept: FAMILY MEDICINE CLINIC | Facility: CLINIC | Age: 30
End: 2020-11-09
Payer: COMMERCIAL

## 2020-11-09 DIAGNOSIS — Z11.1 SCREENING FOR TUBERCULOSIS: Primary | ICD-10-CM

## 2020-11-09 PROCEDURE — 86580 TB INTRADERMAL TEST: CPT

## 2020-11-10 ENCOUNTER — ANNUAL EXAM (OUTPATIENT)
Dept: OBGYN CLINIC | Facility: CLINIC | Age: 30
End: 2020-11-10
Payer: COMMERCIAL

## 2020-11-10 VITALS
DIASTOLIC BLOOD PRESSURE: 80 MMHG | WEIGHT: 130 LBS | TEMPERATURE: 97.3 F | SYSTOLIC BLOOD PRESSURE: 110 MMHG | BODY MASS INDEX: 25.52 KG/M2 | HEIGHT: 60 IN

## 2020-11-10 DIAGNOSIS — Z01.419 ENCOUNTER FOR GYNECOLOGICAL EXAMINATION WITHOUT ABNORMAL FINDING: Primary | ICD-10-CM

## 2020-11-10 PROCEDURE — 99395 PREV VISIT EST AGE 18-39: CPT | Performed by: OBSTETRICS & GYNECOLOGY

## 2020-11-11 ENCOUNTER — CLINICAL SUPPORT (OUTPATIENT)
Dept: FAMILY MEDICINE CLINIC | Facility: CLINIC | Age: 30
End: 2020-11-11

## 2020-11-11 DIAGNOSIS — Z11.1 ENCOUNTER FOR PPD SKIN TEST READING: Primary | ICD-10-CM

## 2020-11-11 LAB
INDURATION: 0 MM
TB SKIN TEST: NEGATIVE

## 2020-11-12 ENCOUNTER — TELEPHONE (OUTPATIENT)
Dept: OTHER | Facility: OTHER | Age: 30
End: 2020-11-12

## 2020-11-18 DIAGNOSIS — L64.9 ANDROGENIC ALOPECIA: ICD-10-CM

## 2020-11-18 RX ORDER — FINASTERIDE 5 MG/1
TABLET, FILM COATED ORAL
Qty: 30 TABLET | Refills: 2 | Status: SHIPPED | OUTPATIENT
Start: 2020-11-18

## 2021-03-19 DIAGNOSIS — N91.2 CESSATION OF MENSES: Primary | ICD-10-CM

## 2022-04-29 NOTE — PROGRESS NOTES
[facility] Follow Up Note    Patient Name: Yony Robertson  Encounter Date: [id number]    Today's Chief Concerns:   Concern #1:  Follow Up Androgenetic Alopecia     Current Medications:    Current Outpatient Medications:     escitalopram (LEXAPRO) 10 mg tablet, Take 1 tablet (10 mg total) by mouth daily, Disp: 30 tablet, Rfl: 3    finasteride (PROSCAR) 5 mg tablet, Take 2 5 mg once daily, Disp: 30 tablet, Rfl: 2    cholecalciferol (VITAMIN D3) 1,000 units tablet, Take 4 tablets (4,000 Units total) by mouth daily, Disp: 120 tablet, Rfl: 2    CONSTITUTIONAL:   Vitals:    07/07/20 0804   Temp: 98 1 °F (36 7 °C)   TempSrc: Tympanic   Weight: 58 1 kg (128 lb)   Height: 5' (1 524 m)       Specific Alerts:    Have you been seen by a [facility] Dermatologist in the last 3 years? YES    Are you pregnant or planning to become pregnant? No    Are you currently or planning to be nursing or breast feeding? No    No Known Allergies    May we call your Preferred Phone number to discuss your specific medical information? YES    May we leave a detailed message that includes your specific medical information? YES    Have you traveled outside of the [facility] in the past 3 months? No    Do you currently have a pacemaker or defibrillator? No    Do you have any artificial heart valves, joints, plates, screws, rods, stents, pins, etc? No   - If Yes, were any placed within the last 2 years? Do you require any medications prior to a surgical procedure? No   - If Yes, for which procedure? n/a   - If Yes, what medications to you require? n/a    Are you taking any medications that cause you to bleed more easily ("blood thinners") No    Have you ever experienced a rapid heartbeat with epinephrine? No    Have you ever been treated with "gold" (gold sodium thiomalate) therapy?  No    [facility] can help with wrinkles, "laugh lines," facial volume loss, "double chin," "love handles," age spots, and more  Are you interested in learning today about some of the skin enhancement procedures that we offer? (If Yes, please provide more detail) No    Review of Systems:  Have you recently had or currently have any of the following? · Fever or chills: No  · Night Sweats: No  · Headaches: No  · Weight Gain: No  · Weight Loss: No  · Blurry Vision: No  · Nausea: No  · Vomiting: No  · Diarrhea: No  · Blood in Stool: No  · Abdominal Pain: No  · Itchy Skin: No  · Painful Joints: No  · Swollen Joints: No  · Muscle Pain: No  · Irregular Mole: No  · Sun Burn: No  · Dry Skin: No  · Skin Color Changes: No  · Scar or Keloid: No  · Cold Sores/Fever Blisters: No  · Bacterial Infections/MRSA: No  · Anxiety: YES  · Depression: No  · Suicidal or Homicidal Thoughts: No      PSYCH: Normal mood and affect  EYES: Normal conjunctiva  ENT: Normal lips and oral mucosa  CARDIOVASCULAR: No edema  RESPIRATORY: Normal respirations  HEME/LYMPH/IMMUNO:  No ostensible subQ swelling except as noted below in ASSESSMENT AND PLAN BY DIAGNOSIS    FULL ORGAN SYSTEM SKIN EXAM (SKIN)  Hair, Scalp, Ears, Face Normal except as noted below in Assessment   Neck, Cervical Chain Nodes    Right Arm/Hand/Fingers    Left Arm/Hand/Fingers    Chest/Breasts/Axillae    Abdomen, Umbilicus    Back/Spine    Groin/Genitalia/Buttocks    Right Leg, Foot, Toes    Left Leg, Foot, Toes        1  HAIR LOSS: ANDROGENETIC ALOPECIA with possible minor component of TELOGEN EFFLUVIUM     Physical Exam:  · Anatomic Location Affected:  Scalp  · Morphological Description:  thinning of hair with increase variability of hair shaft  (Improved)  · Pertinent Positives: n/a  · Pertinent Negatives: No lymphadenopathy     Additional History of Present Condition:  Located on the scalp  Currently on Finasteride half a tab once daily  Deneis any side effects  Unaware of any changes   Does not know if improvement but notices small black hairs  Rogaine once or twice a week   Vitamin D once daily      Assessment and Plan:  Based on a thorough discussion of this condition and the management approach to it (including a comprehensive discussion of the known risks, side effects and potential benefits of treatment), the patient (family) agrees to implement the following specific plan:     · Continue finasteride 2 5 mg once daily  (no personal or family history of breast, ovarian, uterine carcinoma) (on birth control, no interest in pregnancy right now)  · Continue Rogaine 0 5% Foam once a day daily before bedtime (if sticky, can apply on nights before wash her hair in morning -she only washes a few times a week)  Apply rogaine foam in areas of concern  · Continue  OTC Vitamin D (1000 IU daily), continue Nutrafol (over the counter)   · Start Ketoconazole Shampoo 1-2x/week for leave on for 5 minutes and then rinse off completely    · Follow Up 3-4 months     Scribe Attestation    I,:   Sofia Snider am acting as a scribe while in the presence of the attending physician :        I,:   Tahri Correa MD personally performed the services described in this documentation    as scribed in my presence : done